# Patient Record
Sex: FEMALE | Race: WHITE | NOT HISPANIC OR LATINO
[De-identification: names, ages, dates, MRNs, and addresses within clinical notes are randomized per-mention and may not be internally consistent; named-entity substitution may affect disease eponyms.]

---

## 2018-04-04 ENCOUNTER — TRANSCRIPTION ENCOUNTER (OUTPATIENT)
Age: 43
End: 2018-04-04

## 2018-05-08 ENCOUNTER — CHART COPY (OUTPATIENT)
Age: 43
End: 2018-05-08

## 2018-05-08 DIAGNOSIS — Z84.89 FAMILY HISTORY OF OTHER SPECIFIED CONDITIONS: ICD-10-CM

## 2018-05-08 DIAGNOSIS — Z82.49 FAMILY HISTORY OF ISCHEMIC HEART DISEASE AND OTHER DISEASES OF THE CIRCULATORY SYSTEM: ICD-10-CM

## 2018-05-08 DIAGNOSIS — Z87.891 PERSONAL HISTORY OF NICOTINE DEPENDENCE: ICD-10-CM

## 2018-05-08 PROBLEM — Z00.00 ENCOUNTER FOR PREVENTIVE HEALTH EXAMINATION: Status: ACTIVE | Noted: 2018-05-08

## 2018-05-17 ENCOUNTER — APPOINTMENT (OUTPATIENT)
Dept: CARDIOLOGY | Facility: CLINIC | Age: 43
End: 2018-05-17
Payer: COMMERCIAL

## 2018-05-17 VITALS
HEART RATE: 105 BPM | BODY MASS INDEX: 36.91 KG/M2 | WEIGHT: 188 LBS | HEIGHT: 60 IN | DIASTOLIC BLOOD PRESSURE: 78 MMHG | RESPIRATION RATE: 14 BRPM | SYSTOLIC BLOOD PRESSURE: 138 MMHG

## 2018-05-17 PROCEDURE — 99204 OFFICE O/P NEW MOD 45 MIN: CPT

## 2018-05-17 PROCEDURE — 93224 XTRNL ECG REC UP TO 48 HRS: CPT

## 2018-05-17 PROCEDURE — 93000 ELECTROCARDIOGRAM COMPLETE: CPT | Mod: 59

## 2018-06-29 ENCOUNTER — APPOINTMENT (OUTPATIENT)
Dept: CARDIOLOGY | Facility: CLINIC | Age: 43
End: 2018-06-29
Payer: COMMERCIAL

## 2018-06-29 PROCEDURE — 93306 TTE W/DOPPLER COMPLETE: CPT

## 2018-06-29 PROCEDURE — 93015 CV STRESS TEST SUPVJ I&R: CPT

## 2018-07-02 ENCOUNTER — TRANSCRIPTION ENCOUNTER (OUTPATIENT)
Age: 43
End: 2018-07-02

## 2018-07-06 ENCOUNTER — APPOINTMENT (OUTPATIENT)
Dept: CARDIOLOGY | Facility: CLINIC | Age: 43
End: 2018-07-06
Payer: COMMERCIAL

## 2018-07-06 VITALS
WEIGHT: 186 LBS | HEIGHT: 60 IN | HEART RATE: 101 BPM | SYSTOLIC BLOOD PRESSURE: 130 MMHG | BODY MASS INDEX: 36.52 KG/M2 | DIASTOLIC BLOOD PRESSURE: 80 MMHG | RESPIRATION RATE: 16 BRPM

## 2018-07-06 PROCEDURE — 99214 OFFICE O/P EST MOD 30 MIN: CPT

## 2018-07-06 PROCEDURE — 93000 ELECTROCARDIOGRAM COMPLETE: CPT

## 2018-09-18 ENCOUNTER — APPOINTMENT (OUTPATIENT)
Dept: CARDIOLOGY | Facility: CLINIC | Age: 43
End: 2018-09-18

## 2018-09-25 ENCOUNTER — TRANSCRIPTION ENCOUNTER (OUTPATIENT)
Age: 43
End: 2018-09-25

## 2018-10-02 ENCOUNTER — TRANSCRIPTION ENCOUNTER (OUTPATIENT)
Age: 43
End: 2018-10-02

## 2018-12-28 ENCOUNTER — APPOINTMENT (OUTPATIENT)
Dept: CARDIOLOGY | Facility: CLINIC | Age: 43
End: 2018-12-28

## 2019-04-15 ENCOUNTER — APPOINTMENT (OUTPATIENT)
Dept: CARDIOLOGY | Facility: CLINIC | Age: 44
End: 2019-04-15

## 2019-04-16 ENCOUNTER — RX RENEWAL (OUTPATIENT)
Age: 44
End: 2019-04-16

## 2019-04-19 ENCOUNTER — APPOINTMENT (OUTPATIENT)
Dept: CARDIOLOGY | Facility: CLINIC | Age: 44
End: 2019-04-19
Payer: COMMERCIAL

## 2019-04-19 ENCOUNTER — NON-APPOINTMENT (OUTPATIENT)
Age: 44
End: 2019-04-19

## 2019-04-19 VITALS
SYSTOLIC BLOOD PRESSURE: 124 MMHG | WEIGHT: 176 LBS | HEIGHT: 60 IN | OXYGEN SATURATION: 98 % | DIASTOLIC BLOOD PRESSURE: 68 MMHG | HEART RATE: 70 BPM | BODY MASS INDEX: 34.55 KG/M2 | RESPIRATION RATE: 14 BRPM

## 2019-04-19 PROCEDURE — 93000 ELECTROCARDIOGRAM COMPLETE: CPT

## 2019-04-19 PROCEDURE — 99215 OFFICE O/P EST HI 40 MIN: CPT

## 2019-04-19 RX ORDER — HYDROCODONE BITARTRATE AND ACETAMINOPHEN 7.5; 3 MG/1; MG/1
7.5-3 TABLET ORAL
Qty: 50 | Refills: 0 | Status: DISCONTINUED | COMMUNITY
Start: 2018-06-28 | End: 2019-04-19

## 2019-04-19 RX ORDER — IBUPROFEN 600 MG/1
600 TABLET, FILM COATED ORAL
Qty: 8 | Refills: 0 | Status: DISCONTINUED | COMMUNITY
Start: 2017-12-21 | End: 2019-04-19

## 2019-04-23 NOTE — PHYSICAL EXAM
[General Appearance - In No Acute Distress] : no acute distress [General Appearance - Well Developed] : well developed [Normal Conjunctiva] : the conjunctiva exhibited no abnormalities [Normal Oral Mucosa] : normal oral mucosa [Auscultation Breath Sounds / Voice Sounds] : lungs were clear to auscultation bilaterally [Heart Rate And Rhythm] : heart rate and rhythm were normal [Heart Sounds] : normal S1 and S2 [Abnormal Walk] : normal gait [Bowel Sounds] : normal bowel sounds [Skin Color & Pigmentation] : normal skin color and pigmentation [Oriented To Time, Place, And Person] : oriented to person, place, and time [Skin Turgor] : normal skin turgor [Mood] : the mood was normal [Affect] : the affect was normal [FreeTextEntry1] : No edema

## 2019-04-23 NOTE — ASSESSMENT
[FreeTextEntry1] : 1.  EKG today demonstrates normal sinus rhythm at 70 bpm.  Normal intervals.  Non-specific ST-T changes.  \par 2.  Non-ST segment elevation MI:  Patient recovering at this point without further symptoms once placed on calcium channel blocker and Ranexa.  Will continue all other medications for now.  I have recommended that she undergo follow up echocardiography as well as a carotid duplex in 3-4 weeks as well as a pre-rehab exercise stress test followed by phase-II monitored cardiac rehabilitation.  Medications will remain as is.  Recurrent symptoms will warrant more aggressive management. \par 3.  Tobacco dependence:  Patient states that she has stopped smoking ever the since the initial event 10 days ago.  She is strongly advised against restarting. \par 4.  Diabetes mellitus:  Patient advised to continue her current medications and follow a strict low-carbohydrate diet. \par 5.  Hyperlipidemia:  Will obtain fasting lipid profile in approximately 3-4 weeks for further assessment.  Suspect that higher doses of Atorvastatin may be of benefit going forward. \par

## 2019-04-23 NOTE — HISTORY OF PRESENT ILLNESS
[FreeTextEntry1] : Melisa states that [birth year], she developed intermittent retrosternal chest pain radiating to her back and arm as well as into her neck and jaw and proceeded to the emergency room at [facility] where she was noted to have low-level troponin elevations consistent with non-ST segment elevation myocardial infarction.  She subsequently underwent a diagnostic cardiac catheterization which revealed a 30% stenosis of the distal third of the LAD which was not hemodynamically significant enough for intervention.  The left main, circumflex, and right coronary arteries revealed mild atherosclerosis.  Medical management advised.  \par \par Four days ago, the patient had recurrence of symptoms and again proceeded to the emergency room at [facility] where she was evaluated and subsequently released with additional medical therapy which included calcium channel blocker and Ranexa.

## 2019-04-23 NOTE — REASON FOR VISIT
[FreeTextEntry1] : Melisa is a pleasant 44-year-old white female with a past medical history significant for hyperlipidemia, diabetes mellitus, tobacco dependence, and atypical chest pain, who presents for follow up evaluation.

## 2019-04-24 ENCOUNTER — TRANSCRIPTION ENCOUNTER (OUTPATIENT)
Age: 44
End: 2019-04-24

## 2019-04-30 ENCOUNTER — RX RENEWAL (OUTPATIENT)
Age: 44
End: 2019-04-30

## 2019-04-30 RX ORDER — ISOSORBIDE MONONITRATE 30 MG
30 TABLET, EXTENDED RELEASE 24 HR ORAL
Refills: 0 | Status: DISCONTINUED | COMMUNITY
End: 2019-04-30

## 2019-04-30 RX ORDER — RAMIPRIL 1.25 MG/1
1.25 CAPSULE ORAL
Refills: 0 | Status: DISCONTINUED | COMMUNITY
End: 2019-04-30

## 2019-05-07 ENCOUNTER — RX RENEWAL (OUTPATIENT)
Age: 44
End: 2019-05-07

## 2019-05-10 ENCOUNTER — APPOINTMENT (OUTPATIENT)
Dept: CARDIOLOGY | Facility: CLINIC | Age: 44
End: 2019-05-10
Payer: COMMERCIAL

## 2019-05-10 PROCEDURE — 93015 CV STRESS TEST SUPVJ I&R: CPT

## 2019-05-17 ENCOUNTER — APPOINTMENT (OUTPATIENT)
Dept: CARDIOLOGY | Facility: CLINIC | Age: 44
End: 2019-05-17
Payer: COMMERCIAL

## 2019-05-17 PROCEDURE — 93306 TTE W/DOPPLER COMPLETE: CPT

## 2019-05-17 PROCEDURE — 93880 EXTRACRANIAL BILAT STUDY: CPT

## 2019-05-22 ENCOUNTER — APPOINTMENT (OUTPATIENT)
Dept: CARDIOLOGY | Facility: CLINIC | Age: 44
End: 2019-05-22

## 2019-05-24 ENCOUNTER — APPOINTMENT (OUTPATIENT)
Dept: CARDIOLOGY | Facility: CLINIC | Age: 44
End: 2019-05-24

## 2019-05-29 ENCOUNTER — RX RENEWAL (OUTPATIENT)
Age: 44
End: 2019-05-29

## 2019-05-29 ENCOUNTER — APPOINTMENT (OUTPATIENT)
Dept: CARDIOLOGY | Facility: CLINIC | Age: 44
End: 2019-05-29

## 2019-05-31 ENCOUNTER — NON-APPOINTMENT (OUTPATIENT)
Age: 44
End: 2019-05-31

## 2019-05-31 ENCOUNTER — APPOINTMENT (OUTPATIENT)
Dept: CARDIOLOGY | Facility: CLINIC | Age: 44
End: 2019-05-31
Payer: COMMERCIAL

## 2019-05-31 ENCOUNTER — APPOINTMENT (OUTPATIENT)
Dept: CARDIOLOGY | Facility: CLINIC | Age: 44
End: 2019-05-31

## 2019-05-31 VITALS
DIASTOLIC BLOOD PRESSURE: 72 MMHG | WEIGHT: 180 LBS | BODY MASS INDEX: 35.34 KG/M2 | HEART RATE: 75 BPM | SYSTOLIC BLOOD PRESSURE: 128 MMHG | HEIGHT: 60 IN | RESPIRATION RATE: 14 BRPM

## 2019-05-31 DIAGNOSIS — R09.89 OTHER SPECIFIED SYMPTOMS AND SIGNS INVOLVING THE CIRCULATORY AND RESPIRATORY SYSTEMS: ICD-10-CM

## 2019-05-31 PROCEDURE — 99214 OFFICE O/P EST MOD 30 MIN: CPT

## 2019-05-31 PROCEDURE — 93000 ELECTROCARDIOGRAM COMPLETE: CPT

## 2019-05-31 NOTE — HISTORY OF PRESENT ILLNESS
[FreeTextEntry1] : Mrs. Wright presents today for follow up and results of her echocardiogram, carotid duplex and pre-rehab stress test.  Presently feeling well.  States that she feels much better since starting all of her cardiac medications.  She has had a few episodes of discomfort that starts in her upper back and radiates to her left neck, shoulder and jaw.  She takes SL NTG and the pain resolves.  Has noticed that this discomfort occurs mainly at night between the hours of 9-10:30pm.

## 2019-05-31 NOTE — PHYSICAL EXAM
[General Appearance - Well Developed] : well developed [General Appearance - In No Acute Distress] : no acute distress [Normal Conjunctiva] : the conjunctiva exhibited no abnormalities [Normal Oral Mucosa] : normal oral mucosa [] : no respiratory distress [Auscultation Breath Sounds / Voice Sounds] : lungs were clear to auscultation bilaterally [Heart Rate And Rhythm] : heart rate and rhythm were normal [Heart Sounds] : normal S1 and S2 [Edema] : no peripheral edema present [Bowel Sounds] : normal bowel sounds [Abnormal Walk] : normal gait [Skin Color & Pigmentation] : normal skin color and pigmentation [Skin Turgor] : normal skin turgor [Oriented To Time, Place, And Person] : oriented to person, place, and time [Affect] : the affect was normal [Mood] : the mood was normal [Murmurs] : no murmurs present [FreeTextEntry1] : No edema

## 2019-05-31 NOTE — REASON FOR VISIT
[FreeTextEntry1] : The patient is a 44 y.o. white female who presents today for the evaluation and management of hyperlipidemia and atypical chest pain, recent NSTEMI (4/19) cardiac catheterization revealed 30% stenosis at distal portion of LAD.  History of diabetes mellitus and tobacco dependence.

## 2019-05-31 NOTE — DISCUSSION/SUMMARY
[FreeTextEntry1] : Dr. Chaudhry in to speak with the patient.\par \par 1 - Coronary artery disease:  patient is status-post non-ST segment elevation MI.  Occasional chest discomfort that starts in her upper back and radiates to her left neck, shoulder and jaw.  She takes SL NTG and the pain resolves. No other associated symptoms.  At this time we have asked Mrs. Wright to increase Ranexa to 1000mg BID.\par \par Echocardiogram (5/17/2019):\par - Normal left ventricular internal cavity size.\par - Normal global left ventricular systolic function.\par - EF 60-65%\par - The left atrium is normal in size and structure.\par - There is lipomatous hypertrophy of the interatrial septum, without evidence of shunting.\par - The right atrium is normal in size and structure.\par - Normal right ventricular size and systolic function.\par - Normal aortic valve, without any evidence of aortic stenosis or insufficiency.\par - Mild thickening of the anterior and posterior mitral valve leaflets.\par - Interatrial and interventricular septa appear intact, with no echocardiographic evidence of intracardiac shunting.\par - There is no evidence of pericardial effusion.\par \par Pre-Rehab exercise stress test (5/10/2019)\par - Negative exercise stress test for ischemia.\par - The patient's functional capacity was below average.\par - The Duke Treadmill Score was 6, consistent with low risk.\par \par 2 - ? right sided bruit: patient underwent recent carotid duplex (5/17/19)\par - All arteries were clearly visualized.\par - Normla carotid duplex ultrasound exam.\par - Normal intimal-medial thickness bilaterally.\par - The VISHNU is tortuous.\par - There is antegrade flow in the right and left vertebral arteries.\par - Recommend clinical correlation with the above findings.\par \par 3 - Hypertlipidemia:  recent labs cholesterol 144, triglycerides 146, HDL 39, LDL 76, TC/HDL 3.7.  WIll continue with current statin therapy.  Will have repeat fasting blood work with her PCP in the next couple of months.\par \par 4 - Tobacco dependence:  Advised to continue with not smoking.\par \par 5 - Diabetes mellitus:  recent labs - Glucose 107, HgA1c 6.2.  Continue with current medications.  Follow low carbohydrate diet and weight loss.\par \par 6 - Recent labs:  BUN 13, creatinine 0.7, H/H 12.3/37.2, platelets 218, vitamin D 22.4.  Advised patient to start Vitamin D3 5000 units daily.\par \par 7 - Follow up with Dr. Mock in 6-8 weeks.

## 2019-06-03 ENCOUNTER — APPOINTMENT (OUTPATIENT)
Dept: CARDIOLOGY | Facility: CLINIC | Age: 44
End: 2019-06-03

## 2019-06-04 ENCOUNTER — RX RENEWAL (OUTPATIENT)
Age: 44
End: 2019-06-04

## 2019-06-04 ENCOUNTER — MEDICATION RENEWAL (OUTPATIENT)
Age: 44
End: 2019-06-04

## 2019-06-05 ENCOUNTER — APPOINTMENT (OUTPATIENT)
Dept: CARDIOLOGY | Facility: CLINIC | Age: 44
End: 2019-06-05

## 2019-06-07 ENCOUNTER — APPOINTMENT (OUTPATIENT)
Dept: CARDIOLOGY | Facility: CLINIC | Age: 44
End: 2019-06-07

## 2019-06-10 ENCOUNTER — APPOINTMENT (OUTPATIENT)
Dept: CARDIOLOGY | Facility: CLINIC | Age: 44
End: 2019-06-10

## 2019-06-11 ENCOUNTER — RX RENEWAL (OUTPATIENT)
Age: 44
End: 2019-06-11

## 2019-06-11 ENCOUNTER — MEDICATION RENEWAL (OUTPATIENT)
Age: 44
End: 2019-06-11

## 2019-06-11 RX ORDER — RANOLAZINE 1000 MG/1
1000 TABLET, EXTENDED RELEASE ORAL
Qty: 180 | Refills: 3 | Status: DISCONTINUED | COMMUNITY
End: 2019-06-11

## 2019-06-11 RX ORDER — ISOSORBIDE MONONITRATE 30 MG/1
30 TABLET, EXTENDED RELEASE ORAL DAILY
Qty: 90 | Refills: 1 | Status: DISCONTINUED | COMMUNITY
Start: 2019-04-30 | End: 2019-06-11

## 2019-06-12 ENCOUNTER — APPOINTMENT (OUTPATIENT)
Dept: CARDIOLOGY | Facility: CLINIC | Age: 44
End: 2019-06-12

## 2019-06-14 ENCOUNTER — APPOINTMENT (OUTPATIENT)
Dept: CARDIOLOGY | Facility: CLINIC | Age: 44
End: 2019-06-14

## 2019-06-17 ENCOUNTER — APPOINTMENT (OUTPATIENT)
Dept: CARDIOLOGY | Facility: CLINIC | Age: 44
End: 2019-06-17

## 2019-06-19 ENCOUNTER — APPOINTMENT (OUTPATIENT)
Dept: CARDIOLOGY | Facility: CLINIC | Age: 44
End: 2019-06-19

## 2019-06-21 ENCOUNTER — APPOINTMENT (OUTPATIENT)
Dept: CARDIOLOGY | Facility: CLINIC | Age: 44
End: 2019-06-21

## 2019-06-24 ENCOUNTER — APPOINTMENT (OUTPATIENT)
Dept: CARDIOLOGY | Facility: CLINIC | Age: 44
End: 2019-06-24

## 2019-06-26 ENCOUNTER — APPOINTMENT (OUTPATIENT)
Dept: CARDIOLOGY | Facility: CLINIC | Age: 44
End: 2019-06-26

## 2019-06-28 ENCOUNTER — APPOINTMENT (OUTPATIENT)
Dept: CARDIOLOGY | Facility: CLINIC | Age: 44
End: 2019-06-28

## 2019-07-17 ENCOUNTER — MEDICATION RENEWAL (OUTPATIENT)
Age: 44
End: 2019-07-17

## 2019-07-17 ENCOUNTER — RX RENEWAL (OUTPATIENT)
Age: 44
End: 2019-07-17

## 2019-07-18 ENCOUNTER — APPOINTMENT (OUTPATIENT)
Dept: CARDIOLOGY | Facility: CLINIC | Age: 44
End: 2019-07-18
Payer: COMMERCIAL

## 2019-07-18 ENCOUNTER — NON-APPOINTMENT (OUTPATIENT)
Age: 44
End: 2019-07-18

## 2019-07-18 VITALS
HEIGHT: 60 IN | SYSTOLIC BLOOD PRESSURE: 124 MMHG | RESPIRATION RATE: 14 BRPM | BODY MASS INDEX: 36.12 KG/M2 | HEART RATE: 81 BPM | DIASTOLIC BLOOD PRESSURE: 73 MMHG | WEIGHT: 184 LBS

## 2019-07-18 PROCEDURE — 99215 OFFICE O/P EST HI 40 MIN: CPT

## 2019-07-18 PROCEDURE — 93000 ELECTROCARDIOGRAM COMPLETE: CPT

## 2019-07-18 RX ORDER — ASPIRIN 325 MG/1
325 TABLET, FILM COATED ORAL DAILY
Refills: 0 | Status: DISCONTINUED | COMMUNITY
End: 2019-07-18

## 2019-07-18 NOTE — DISCUSSION/SUMMARY
[FreeTextEntry1] : 1).  The patient is cardiovascularly stable in regards to recent unchanged EKG, remaining to be asymptomatic, and recent cardiac cath and echocardiogram demonstrating no significant ischemia and/or decrease in cardiac function respectively.\par \par 2).  Based upon Ms. Wright's otherwise stable cardiac pattern, there is no absolute cardiac contraindication for her to undergo the proposed endoscopy procedure.  She may hold the Aspirin and Plavix 5 to 7 days prior to the procedure and restart thereafter if you deem it safe.  She will decrease the aspirin dosage to 81 mg daily once she restarts.\par \par 3).  Follow up with Dr. Mock in 2 to 3 months or PRN.\par \par If I can be of additional assistance, please do not hesitate to call.

## 2019-07-18 NOTE — ASSESSMENT
[FreeTextEntry1] : EKG 7/18/2019:  The EKG illustrates sinus rhythm, rate of 81, low voltage in precordial leads, non-specific T wave abnormality.  Essentially unchanged.\par \par The patient remains to have history of NO cardiac stents placed, she was given DAPT therapy on April 10th due to possible vasospasm with thrombus.

## 2019-07-18 NOTE — REASON FOR VISIT
[FreeTextEntry1] : The patient is a 44 y.o. white female who presents today for the evaluation and management of hyperlipidemia and atypical chest pain, recent NSTEMI (4/19) cardiac catheterization revealed 30% stenosis at distal portion of LAD. History of diabetes mellitus and tobacco dependence.  \par \par Mrs. Wright had two additional hospital visits status post her recent NSTEMI and non-obstructive CAD on cardiac catheterization (April 14th and June 5th).  Both hospital visits were for experiencing symptoms of jaw pain, shortness of breath, nausea and back pain.  During her 3rd hospital course on June 5th, the Ranexa was decreased to 500 mg BID and Isosorbide increased to 60 mg QD in light of possible vasospasms and/or microvascular disease.\par \par The patient is here today regarding cardiac clearance for a scheduled Endoscopy procedure with Dr. Gonzalez at [facility] on July 29th.  This is in regards to evaluating her for possible esophageal spasms.\par \par She reports feeling much better since the augmentation of her Ranexa and Isosorbide regimen.  She denies CP, SOB, TYLER, PND, orthopnea, palpitations, presyncope, syncope.

## 2019-07-18 NOTE — PHYSICAL EXAM
[Normal Appearance] : normal appearance [General Appearance - In No Acute Distress] : no acute distress [Normal Conjunctiva] : the conjunctiva exhibited no abnormalities [Normal Oral Mucosa] : normal oral mucosa [Normal Jugular Venous A Waves Present] : normal jugular venous A waves present [Normal Jugular Venous V Waves Present] : normal jugular venous V waves present [No Jugular Venous Davies A Waves] : no jugular venous davies A waves [] : no respiratory distress [Respiration, Rhythm And Depth] : normal respiratory rhythm and effort [Auscultation Breath Sounds / Voice Sounds] : lungs were clear to auscultation bilaterally [Heart Rate And Rhythm] : heart rate and rhythm were normal [Heart Sounds] : normal S1 and S2 [Murmurs] : no murmurs present [Edema] : no peripheral edema present [Bowel Sounds] : normal bowel sounds [Abnormal Walk] : normal gait [Nail Clubbing] : no clubbing of the fingernails [Cyanosis, Localized] : no localized cyanosis [Skin Color & Pigmentation] : normal skin color and pigmentation [Skin Turgor] : normal skin turgor [Oriented To Time, Place, And Person] : oriented to person, place, and time [Impaired Insight] : insight and judgment were intact [Affect] : the affect was normal [FreeTextEntry1] : Obese

## 2019-07-31 ENCOUNTER — RX RENEWAL (OUTPATIENT)
Age: 44
End: 2019-07-31

## 2019-07-31 ENCOUNTER — MEDICATION RENEWAL (OUTPATIENT)
Age: 44
End: 2019-07-31

## 2019-08-30 ENCOUNTER — RX RENEWAL (OUTPATIENT)
Age: 44
End: 2019-08-30

## 2019-09-17 ENCOUNTER — RX RENEWAL (OUTPATIENT)
Age: 44
End: 2019-09-17

## 2019-09-17 ENCOUNTER — MEDICATION RENEWAL (OUTPATIENT)
Age: 44
End: 2019-09-17

## 2019-09-20 ENCOUNTER — EMERGENCY (EMERGENCY)
Facility: HOSPITAL | Age: 44
LOS: 1 days | Discharge: HOME | End: 2019-09-20
Attending: EMERGENCY MEDICINE
Payer: COMMERCIAL

## 2019-09-20 VITALS
WEIGHT: 179.9 LBS | HEART RATE: 93 BPM | TEMPERATURE: 99 F | RESPIRATION RATE: 18 BRPM | SYSTOLIC BLOOD PRESSURE: 143 MMHG | OXYGEN SATURATION: 99 % | DIASTOLIC BLOOD PRESSURE: 85 MMHG

## 2019-09-20 LAB
ALBUMIN SERPL ELPH-MCNC: 3.9 G/DL — SIGNIFICANT CHANGE UP (ref 3.3–5.2)
ALP SERPL-CCNC: 68 U/L — SIGNIFICANT CHANGE UP (ref 40–120)
ALT FLD-CCNC: 17 U/L — SIGNIFICANT CHANGE UP
ANION GAP SERPL CALC-SCNC: 13 MMOL/L — SIGNIFICANT CHANGE UP (ref 5–17)
AST SERPL-CCNC: 11 U/L — SIGNIFICANT CHANGE UP
BASOPHILS # BLD AUTO: 0.04 K/UL — SIGNIFICANT CHANGE UP (ref 0–0.2)
BASOPHILS NFR BLD AUTO: 0.5 % — SIGNIFICANT CHANGE UP (ref 0–2)
BILIRUB SERPL-MCNC: 0.2 MG/DL — LOW (ref 0.4–2)
BUN SERPL-MCNC: 14 MG/DL — SIGNIFICANT CHANGE UP (ref 8–20)
CALCIUM SERPL-MCNC: 10.2 MG/DL — SIGNIFICANT CHANGE UP (ref 8.6–10.2)
CHLORIDE SERPL-SCNC: 98 MMOL/L — SIGNIFICANT CHANGE UP (ref 98–107)
CO2 SERPL-SCNC: 26 MMOL/L — SIGNIFICANT CHANGE UP (ref 22–29)
CREAT SERPL-MCNC: 0.68 MG/DL — SIGNIFICANT CHANGE UP (ref 0.5–1.3)
EOSINOPHIL # BLD AUTO: 0.13 K/UL — SIGNIFICANT CHANGE UP (ref 0–0.5)
EOSINOPHIL NFR BLD AUTO: 1.6 % — SIGNIFICANT CHANGE UP (ref 0–6)
GLUCOSE SERPL-MCNC: 168 MG/DL — HIGH (ref 70–115)
HCT VFR BLD CALC: 38.3 % — SIGNIFICANT CHANGE UP (ref 34.5–45)
HGB BLD-MCNC: 12.8 G/DL — SIGNIFICANT CHANGE UP (ref 11.5–15.5)
IMM GRANULOCYTES NFR BLD AUTO: 1.2 % — SIGNIFICANT CHANGE UP (ref 0–1.5)
LYMPHOCYTES # BLD AUTO: 2.17 K/UL — SIGNIFICANT CHANGE UP (ref 1–3.3)
LYMPHOCYTES # BLD AUTO: 26.3 % — SIGNIFICANT CHANGE UP (ref 13–44)
MCHC RBC-ENTMCNC: 27.9 PG — SIGNIFICANT CHANGE UP (ref 27–34)
MCHC RBC-ENTMCNC: 33.4 GM/DL — SIGNIFICANT CHANGE UP (ref 32–36)
MCV RBC AUTO: 83.4 FL — SIGNIFICANT CHANGE UP (ref 80–100)
MONOCYTES # BLD AUTO: 0.64 K/UL — SIGNIFICANT CHANGE UP (ref 0–0.9)
MONOCYTES NFR BLD AUTO: 7.8 % — SIGNIFICANT CHANGE UP (ref 2–14)
NEUTROPHILS # BLD AUTO: 5.17 K/UL — SIGNIFICANT CHANGE UP (ref 1.8–7.4)
NEUTROPHILS NFR BLD AUTO: 62.6 % — SIGNIFICANT CHANGE UP (ref 43–77)
PLATELET # BLD AUTO: 218 K/UL — SIGNIFICANT CHANGE UP (ref 150–400)
POTASSIUM SERPL-MCNC: 3.8 MMOL/L — SIGNIFICANT CHANGE UP (ref 3.5–5.3)
POTASSIUM SERPL-SCNC: 3.8 MMOL/L — SIGNIFICANT CHANGE UP (ref 3.5–5.3)
PROT SERPL-MCNC: 6.6 G/DL — SIGNIFICANT CHANGE UP (ref 6.6–8.7)
RBC # BLD: 4.59 M/UL — SIGNIFICANT CHANGE UP (ref 3.8–5.2)
RBC # FLD: 12.9 % — SIGNIFICANT CHANGE UP (ref 10.3–14.5)
SODIUM SERPL-SCNC: 137 MMOL/L — SIGNIFICANT CHANGE UP (ref 135–145)
TROPONIN T SERPL-MCNC: <0.01 NG/ML — SIGNIFICANT CHANGE UP (ref 0–0.06)
WBC # BLD: 8.25 K/UL — SIGNIFICANT CHANGE UP (ref 3.8–10.5)
WBC # FLD AUTO: 8.25 K/UL — SIGNIFICANT CHANGE UP (ref 3.8–10.5)

## 2019-09-20 PROCEDURE — 99285 EMERGENCY DEPT VISIT HI MDM: CPT

## 2019-09-20 PROCEDURE — 93010 ELECTROCARDIOGRAM REPORT: CPT

## 2019-09-20 RX ORDER — LIDOCAINE 4 G/100G
10 CREAM TOPICAL ONCE
Refills: 0 | Status: COMPLETED | OUTPATIENT
Start: 2019-09-20 | End: 2019-09-20

## 2019-09-20 RX ORDER — MORPHINE SULFATE 50 MG/1
2 CAPSULE, EXTENDED RELEASE ORAL ONCE
Refills: 0 | Status: DISCONTINUED | OUTPATIENT
Start: 2019-09-20 | End: 2019-09-20

## 2019-09-20 RX ORDER — NITROGLYCERIN 6.5 MG
0.5 CAPSULE, EXTENDED RELEASE ORAL ONCE
Refills: 0 | Status: COMPLETED | OUTPATIENT
Start: 2019-09-20 | End: 2019-09-20

## 2019-09-20 RX ADMIN — Medication 0.5 INCH(S): at 23:51

## 2019-09-20 RX ADMIN — Medication 30 MILLILITER(S): at 23:44

## 2019-09-20 RX ADMIN — MORPHINE SULFATE 2 MILLIGRAM(S): 50 CAPSULE, EXTENDED RELEASE ORAL at 23:44

## 2019-09-20 RX ADMIN — LIDOCAINE 10 MILLILITER(S): 4 CREAM TOPICAL at 23:44

## 2019-09-20 NOTE — ED PROVIDER NOTE - CLINICAL SUMMARY MEDICAL DECISION MAKING FREE TEXT BOX
Possible angina, observation, serial enzymes, telemetry, consult Alejandro/Todd Eng for the morning.

## 2019-09-20 NOTE — ED PROVIDER NOTE - OBJECTIVE STATEMENT
43 y/o F pt with hx of MI and DM presents to ED c/o burning chest pain that began earlier today. Pt states she has had heartburn all day and felt sick all day. Pt took Zantac this morning with little relief. Pain returned at 12 pm and she took Protonix with no relief. Pt then took 2 Nitro pills at 9:00 pm along with 2 TUMS with some relief. Pain then began to radiate to her L jaw, she became nauseous and clammy and she took more Protonix with no relief. Pt states this episode is similar to the MI she has in April. Pt states at that time she had a cardiac cath which showed no obstructions, but was told she had coronary vasospasm. She had another episode approximately 2 months ago which was also attributed to coronary vasospasm. Pt is a nonsmoker, nondrinker. Denies any recent travel, use of OCP's, calf pain, fever, chills, SOB, abd pain, and n/v/d. No further complaints at this time.   Cardio: Todd Eng

## 2019-09-20 NOTE — ED ADULT TRIAGE NOTE - CHIEF COMPLAINT QUOTE
patient biba from home states that she "hasn't been feeling well all day" patient states that she had heartburn and took zantac and protonix without relief. patient states that she has a history of MI, states that the pain radiated from her chest and up to her jaw. patient states that the pain was relieved slighly with nitro, patient took 2 nitro prior to arrival and 324 mg of asa, patient states that she had a cardiac cath in [place] in april,

## 2019-09-21 VITALS
OXYGEN SATURATION: 98 % | SYSTOLIC BLOOD PRESSURE: 103 MMHG | HEART RATE: 79 BPM | RESPIRATION RATE: 16 BRPM | DIASTOLIC BLOOD PRESSURE: 64 MMHG | TEMPERATURE: 98 F

## 2019-09-21 LAB
APPEARANCE UR: CLEAR — SIGNIFICANT CHANGE UP
BILIRUB UR-MCNC: NEGATIVE — SIGNIFICANT CHANGE UP
COLOR SPEC: YELLOW — SIGNIFICANT CHANGE UP
DIFF PNL FLD: NEGATIVE — SIGNIFICANT CHANGE UP
GLUCOSE UR QL: NEGATIVE MG/DL — SIGNIFICANT CHANGE UP
HCG UR QL: NEGATIVE — SIGNIFICANT CHANGE UP
KETONES UR-MCNC: NEGATIVE — SIGNIFICANT CHANGE UP
LEUKOCYTE ESTERASE UR-ACNC: NEGATIVE — SIGNIFICANT CHANGE UP
NITRITE UR-MCNC: NEGATIVE — SIGNIFICANT CHANGE UP
PH UR: 6 — SIGNIFICANT CHANGE UP (ref 5–8)
PROT UR-MCNC: NEGATIVE MG/DL — SIGNIFICANT CHANGE UP
SP GR SPEC: 1.02 — SIGNIFICANT CHANGE UP (ref 1.01–1.02)
TROPONIN T SERPL-MCNC: <0.01 NG/ML — SIGNIFICANT CHANGE UP (ref 0–0.06)
TROPONIN T SERPL-MCNC: <0.01 NG/ML — SIGNIFICANT CHANGE UP (ref 0–0.06)
UROBILINOGEN FLD QL: NEGATIVE MG/DL — SIGNIFICANT CHANGE UP

## 2019-09-21 PROCEDURE — 99222 1ST HOSP IP/OBS MODERATE 55: CPT

## 2019-09-21 PROCEDURE — 93005 ELECTROCARDIOGRAM TRACING: CPT

## 2019-09-21 PROCEDURE — 81025 URINE PREGNANCY TEST: CPT

## 2019-09-21 PROCEDURE — 99234 HOSP IP/OBS SM DT SF/LOW 45: CPT

## 2019-09-21 PROCEDURE — G0378: CPT

## 2019-09-21 PROCEDURE — 71045 X-RAY EXAM CHEST 1 VIEW: CPT | Mod: 26

## 2019-09-21 PROCEDURE — 81003 URINALYSIS AUTO W/O SCOPE: CPT

## 2019-09-21 PROCEDURE — 71045 X-RAY EXAM CHEST 1 VIEW: CPT

## 2019-09-21 PROCEDURE — 84484 ASSAY OF TROPONIN QUANT: CPT

## 2019-09-21 PROCEDURE — 80053 COMPREHEN METABOLIC PANEL: CPT

## 2019-09-21 PROCEDURE — 93010 ELECTROCARDIOGRAM REPORT: CPT

## 2019-09-21 PROCEDURE — 36415 COLL VENOUS BLD VENIPUNCTURE: CPT

## 2019-09-21 PROCEDURE — 85027 COMPLETE CBC AUTOMATED: CPT

## 2019-09-21 RX ORDER — PANTOPRAZOLE SODIUM 20 MG/1
40 TABLET, DELAYED RELEASE ORAL
Refills: 0 | Status: DISCONTINUED | OUTPATIENT
Start: 2019-09-21 | End: 2019-10-06

## 2019-09-21 RX ORDER — ACETAMINOPHEN 500 MG
650 TABLET ORAL EVERY 6 HOURS
Refills: 0 | Status: DISCONTINUED | OUTPATIENT
Start: 2019-09-21 | End: 2019-10-06

## 2019-09-21 RX ORDER — RANOLAZINE 500 MG/1
500 TABLET, FILM COATED, EXTENDED RELEASE ORAL
Refills: 0 | Status: DISCONTINUED | OUTPATIENT
Start: 2019-09-21 | End: 2019-10-06

## 2019-09-21 RX ORDER — LISINOPRIL 2.5 MG/1
40 TABLET ORAL DAILY
Refills: 0 | Status: DISCONTINUED | OUTPATIENT
Start: 2019-09-21 | End: 2019-10-06

## 2019-09-21 RX ORDER — NITROGLYCERIN 6.5 MG
0.4 CAPSULE, EXTENDED RELEASE ORAL
Refills: 0 | Status: DISCONTINUED | OUTPATIENT
Start: 2019-09-21 | End: 2019-10-06

## 2019-09-21 RX ORDER — BUPROPION HYDROCHLORIDE 150 MG/1
150 TABLET, EXTENDED RELEASE ORAL DAILY
Refills: 0 | Status: DISCONTINUED | OUTPATIENT
Start: 2019-09-21 | End: 2019-10-06

## 2019-09-21 RX ORDER — ASPIRIN/CALCIUM CARB/MAGNESIUM 324 MG
81 TABLET ORAL DAILY
Refills: 0 | Status: DISCONTINUED | OUTPATIENT
Start: 2019-09-21 | End: 2019-10-06

## 2019-09-21 RX ORDER — ATORVASTATIN CALCIUM 80 MG/1
20 TABLET, FILM COATED ORAL AT BEDTIME
Refills: 0 | Status: DISCONTINUED | OUTPATIENT
Start: 2019-09-21 | End: 2019-10-06

## 2019-09-21 RX ORDER — CLOPIDOGREL BISULFATE 75 MG/1
75 TABLET, FILM COATED ORAL DAILY
Refills: 0 | Status: DISCONTINUED | OUTPATIENT
Start: 2019-09-21 | End: 2019-10-06

## 2019-09-21 RX ADMIN — PANTOPRAZOLE SODIUM 40 MILLIGRAM(S): 20 TABLET, DELAYED RELEASE ORAL at 06:03

## 2019-09-21 RX ADMIN — Medication 0.5 INCH(S): at 10:43

## 2019-09-21 RX ADMIN — RANOLAZINE 500 MILLIGRAM(S): 500 TABLET, FILM COATED, EXTENDED RELEASE ORAL at 06:03

## 2019-09-21 RX ADMIN — LISINOPRIL 40 MILLIGRAM(S): 2.5 TABLET ORAL at 06:03

## 2019-09-21 NOTE — ED CDU PROVIDER INITIAL DAY NOTE - MEDICAL DECISION MAKING DETAILS
43 y/o F pt with hx of MI and DM presents to ED c/o burning chest pain that began earlier today. tele, serial ekg, trops, cardio consult

## 2019-09-21 NOTE — ED ADULT NURSE NOTE - OBJECTIVE STATEMENT
Pt is here with c/o chest pain that she describes as heartburn.  Pt has hx of MI in April and states the pain is the same.  Pt took nitro and aspirin x 3 today and states that it helped the pain. Pt medicated as ordered.

## 2019-09-21 NOTE — ED ADULT NURSE REASSESSMENT NOTE - GENERAL PATIENT STATE
cooperative/resting/sleeping/smiling/interactive/comfortable appearance/improvement verbalized/family/SO at bedside
comfortable appearance/cooperative

## 2019-09-21 NOTE — ED ADULT NURSE REASSESSMENT NOTE - NS ED NURSE REASSESS COMMENT FT1
Pt alert and oriented. resting in stretcher, no signs of distress noted. Handoff report given to Nandini Vincent RN. pt's safety maintained. RN with no questions or concerns at this time
pt sleeping in stretcher, no apparent distress noted. bed in lowest position, call bell within reach and safety maintained.
assumed pt care from previous Rn Isabell Mobley.  pt transported to CDU-9 for observation. pt  A&Ox4;  resting in stretcher, with no complaints of pain or discomfort. B/L lungs clear, normal s1&s2 heard on ausculation. (+) pedal pulses, skin warm/dry intact. PIV patent.  remains NSR on cardiac monitor. VSS and documented as per flow sheet. plan of care reviewed and pt verbalizes understanding. bed in lowest position, call bell within reach and safety maintained. monitoring ongoing for any changes.
Assumed care of the patient at 0730. Patient A&Ox4. no s/s of distress or pain. Denies CP/SOB or dizziness. VSS. NSR on CM. Patient awaiting cardiology consult. Patient in understanding of plan of care. Patient with no further questions for the nurse. Will continue to monitor.

## 2019-09-21 NOTE — CONSULT NOTE ADULT - SUBJECTIVE AND OBJECTIVE BOX
KATHRYN PARRA  [id number]      HPI:  43 y/o female PMHx nonobstructive CAD, coronary vasospasm, chronic CP, obesity p/w CP.  Patient states she has been having a stressful week and started having intermittent CP a few days ago.  Yesterday it became more constant and severe.  She tried Zantac without relief.  She took NTG with some relief.  Pain persisted and took Protonix with no change.  Took more NTG with some more relief but became sweaty and SOB, and pain went to neck, and came to ER.  In ER placed on NTP and CP resolved.  Pain similar to prior spasm episodes.  Now CP free.  Denies any c/o at this time.   Denies palps, orthopnea, syncope.      ALLERGIES:  Ceclor (Unknown)  Cipro (Unknown)  Lorabid (Unknown)      PAST MEDICAL & SURGICAL HISTORY:  As noted above    MEDICATIONS (HOME):  aspirin  chewable 81 milliGRAM(s) Oral daily  atorvastatin 20 milliGRAM(s) Oral at bedtime  buPROPion  milliGRAM(s) Oral daily  clopidogrel Tablet 75 milliGRAM(s) Oral daily  lisinopril 40 milliGRAM(s) Oral daily  imdur 60mg daily  pantoprazole    Tablet 40 milliGRAM(s) Oral before breakfast  ranolazine 500 milliGRAM(s) Oral two times a day  ramipril 1.25mg daily      SOCIAL HISTORY:  Patient denies alcohol, tobacco, drug use    FAMILY HISTORY:  Reviewed and n/c at this time    ROS:  Patient denies cough, and other than noted above full ROS is unremarkable      PHYSICAL EXAM:  Vital Signs Last 24 Hrs  T(C): 36.6 (21 Sep 2019 03:58), Max: 37.2 (20 Sep 2019 22:04)  T(F): 97.9 (21 Sep 2019 03:58), Max: 99 (20 Sep 2019 22:04)  HR: 83 (21 Sep 2019 06:01) (68 - 93)  BP: 116/72 (21 Sep 2019 06:01) (109/72 - 143/85)  BP(mean): 86 (21 Sep 2019 06:01) (86 - 86)  RR: 18 (21 Sep 2019 03:58) (18 - 19)  SpO2: 98% (21 Sep 2019 03:58) (97% - 99%)  General: Patient comfortable in NAD  HEENT: NCAT, mmm, EOMI  Neck: no JVD, no carotid bruits  CVS: nl s1, split s2, no s3, no s4, no murmur or rubs, RRR  Chest: CTA b/l  Abdomen: soft, nt/nd  Extremities: No c/c/e  Neuro: A&O x3  Psych: Normal affect      ECG:  SR with no ischemic changes    LABS:                        12.8   8.25  )-----------( 218      ( 20 Sep 2019 22:29 )             38.3     09-20    137  |  98  |  14.0  ----------------------------<  168<H>  3.8   |  26.0  |  0.68    Ca    10.2      20 Sep 2019 22:29    TPro  6.6  /  Alb  3.9  /  TBili  0.2<L>  /  DBili  x   /  AST  11  /  ALT  17  /  AlkPhos  68  09-20    CARDIAC MARKERS ( 21 Sep 2019 05:41 )  x     / <0.01 ng/mL / x     / x     / x      CARDIAC MARKERS ( 21 Sep 2019 01:55 )  x     / <0.01 ng/mL / x     / x     / x      CARDIAC MARKERS ( 20 Sep 2019 22:29 )  x     / <0.01 ng/mL / x     / x     / x            RADIOLOGY:  Clear lungs      Assessment:  43 y/o female PMHx nonobstructive CAD, coronary vasospasm, chronic CP, obesity p/w CP.  Patient states she has been having a stressful week and started having intermittent CP a few days ago.  Yesterday it became more constant and severe.  Patient ultimately with relief only from NTG.  Now CP free.  Trops negative and EKG nonischemic.  Likely 2/2 spasm, esophageal spasm cannot be ruled out.   Sweating likely 2/2 low BP from NTG, also resolved.  Had recent change in meds and was doing well.  Would not make additional changes for now.    Plan:  1. CV stable for d/c.  2. Continue current CV meds at current doses from home.  3. Close OP f/u and if more symptoms consider increasing CCB.    Thanks!

## 2019-09-21 NOTE — ED CDU PROVIDER INITIAL DAY NOTE - ATTENDING CONTRIBUTION TO CARE
seen with acp: well appearing, NAD; well perfused; no pedal edema; abd soft nt nd; no jvd; clear lungs; regular pulse and rhythm; plan for obs, tele, serial enzymes, cards dr yun to be called for consult

## 2019-09-21 NOTE — ED CDU PROVIDER DISPOSITION NOTE - ATTENDING CONTRIBUTION TO CARE
I agree with the PA's note and was available for any issues/concerns. I was directly involved in patient care. My brief overall assessment is as follows: pt feeling much better, no cp, seen and cleared by cards, close f/u, return precautions and results. very well appearing.

## 2019-09-21 NOTE — ED CDU PROVIDER DISPOSITION NOTE - CLINICAL COURSE
43 y/o female PMHx nonobstructive CAD, coronary vasospasm, chronic CP, obesity p/w CP.  Patient states she has been having a stressful week and started having intermittent CP a few days ago.  Yesterday it became more constant and severe.  She tried Zantac without relief.  She took NTG with some relief.  Pain persisted and took Protonix with no change.  Took more NTG with some more relief but became sweaty and SOB, and pain went to neck, and came to ER.  In ER placed on NTP and CP resolved.  Pain similar to prior spasm episodes.  Has remained CP free.  Troponin negative x 3, seen by Cardiology and cleared for d/c home with outpatient f/u.

## 2019-09-21 NOTE — ED CDU PROVIDER DISPOSITION NOTE - PATIENT PORTAL LINK FT
You can access the FollowMyHealth Patient Portal offered by [facility] by registering at the following website: [email or web address]. By joining [employer]’s FollowMyHealth portal, you will also be able to view your health information using other applications (apps) compatible with our system.

## 2019-09-21 NOTE — ED ADULT NURSE NOTE - CAS EDN INTEG ASSESS
After Visit Summary   8/9/2018    Sebas Lopez    MRN: [id number]           Patient Information     Date Of Birth          1965        Visit Information        Provider Department      8/9/2018 4:00 PM Albert Long MD [facility]        Today's Diagnoses     Anemia, unspecified type    -  1    Colon adenocarcinoma (H)          Care Instructions    SCHEDULE IRINOTECAN AND PANITUMUMAB NEXT WEEK    1 WEEK AFTER THAT SEE ANEL AND POSSIBLE INFUSION ROOM APPOINTMENT FOR IVF              Follow-ups after your visit        Your next 10 appointments already scheduled     Aug 15, 2018  5:00 PM CDT   TELEMEDICINE with Sherry Cor[facility] ([facility])    [address] [place] [address]  [address] [place] [id number] [phone]           Note: this is not an onsite visit; there is no need to come to the facility.            Aug 22, 2018  2:00 PM CDT   (Arrive by 1:45 PM)   Return Visit with Lore Calix MD   [facility] ([facility])    [address] [place] [address]  [address] [place] [id number] [phone]              Who to contact     If you have questions or need follow up information about today's clinic visit or your schedule please contact [facility] directly at [phone].  Normal or non-critical lab and imaging results will be communicated to you by MyChart, letter or phone within 4 business days after the clinic has received the results. If you do not hear from us within 7 days, please contact the clinic through MyChart or phone. If you have a critical or abnormal lab result, we will notify you by phone as soon as possible.  Submit refill requests through ki [employer] or call your pharmacy and they will forward the refill request to us. Please allow 3 business days for your refill to be completed.          Additional Information About  "Your Visit        MyChart Information     [employer] gives you secure access to your electronic health record. If you see a primary care provider, you can also send messages to your care team and make appointments. If you have questions, please call your primary care clinic.  If you do not have a primary care provider, please call [phone] and they will assist you.        Care EveryWhere ID     This is your Care EveryWhere ID. This could be used by other organizations to access your [facility] medical records  [id number]        Your Vitals Were     Pulse Temperature Respirations Height Pulse Oximetry BMI (Body Mass Index)    89 98.1  F (36.7  C) (Oral) 16 1.753 m (5' 9\") 98% 23.33 kg/m2       Blood Pressure from Last 3 Encounters:   08/09/18 116/80   08/09/18 116/80   08/06/18 124/85    Weight from Last 3 Encounters:   08/09/18 71.7 kg (158 lb)   08/09/18 71.8 kg (158 lb 6.4 oz)   08/06/18 71.5 kg (157 lb 10.1 oz)              We Performed the Following     CBC with platelets differential     Comprehensive metabolic panel     Ferritin     Iron and iron binding capacity     Magnesium     Phosphorus          Today's Medication Changes          These changes are accurate as of 8/9/18  4:34 PM.  If you have any questions, ask your nurse or doctor.               Start taking these medicines.        Dose/Directions    * hydromorphone HCl 500 MG/50ML Soln   Used for:  Neoplasm related pain   Started by:  Lorenzo Trujillo MD        Dilute 250mg in 250ml NS  0.9mg IV every 10 mins as needed  Dispense 1 bag (250mg = 25ml)   Quantity:  25 mL   Refills:  0       * hydromorphone HCl 500 MG/50ML Soln   Used for:  Neoplasm related pain   Started by:  Lorenzo Trujillo MD        Start taking on:  8/14/2018   Dilute 250mg in 250ml NS  0.9mg IV every 10 mins as needed  Dispense 1 bag (250mg = 25ml)   Quantity:  25 mL   Refills:  0       * hydromorphone HCl 500 MG/50ML Soln   Used for:  Neoplasm related pain   Started by:  " Lorenzo Trujillo MD        Start taking on:  8/21/2018   Dilute 250mg in 250ml NS  0.9mg IV every 10 mins as needed  Dispense 1 bag (250mg = 25ml)   Quantity:  25 mL   Refills:  0       * hydromorphone HCl 500 MG/50ML Soln   Used for:  Neoplasm related pain   Started by:  Lorenzo Trujillo MD        Start taking on:  8/28/2018   Dilute 250mg in 250ml NS  0.9mg IV every 10 mins as needed  Dispense 1 bag (250mg = 25ml)   Quantity:  25 mL   Refills:  0       * Notice:  This list has 4 medication(s) that are the same as other medications prescribed for you. Read the directions carefully, and ask your doctor or other care provider to review them with you.         Where to get your medicines      Some of these will need a paper prescription and others can be bought over the counter.  Ask your nurse if you have questions.     Bring a paper prescription for each of these medications     fentaNYL 100 mcg/hr 72 hr patch    hydromorphone HCl 500 MG/50ML Soln    hydromorphone HCl 500 MG/50ML Soln    hydromorphone HCl 500 MG/50ML Soln    hydromorphone HCl 500 MG/50ML Soln                Primary Care Provider Office Phone # Fax #    Jaguar Becker [phone]       [address] [address]        Equal Access to Services     [facility]: Hadii aad ku hadasho Soomaali, waaxda luqadaha, qaybta kaalmada adeegyada, bhargav sage . So [facility] [phone].    ATENCIÓN: Si habla español, tiene a cid disposición servicios gratuitos de asistencia lingüística. Llame al [phone].    We comply with applicable federal civil rights laws and [place] laws. We do not discriminate on the basis of race, color, national origin, age, disability, sex, sexual orientation, or gender identity.            Thank you!     Thank you for choosing [facility]  for your care. Our goal is always to provide you with excellent care. Hearing back from our  patients is one way we can continue to improve our services. Please take a few minutes to complete the written survey that you may receive in the mail after your visit with us. Thank you!             Your Updated Medication List - Protect others around you: Learn how to safely use, store and throw away your medicines at www.disposemymeds.org.          This list is accurate as of 8/9/18  4:34 PM.  Always use your most recent med list.                   Brand Name Dispense Instructions for use Diagnosis    dexamethasone 4 MG tablet    DECADRON    20 tablet    Take 1 tablet (4 mg) by mouth daily    Colon adenocarcinoma (H), Neoplasm related pain       * fentaNYL 25 mcg/hr 72 hr patch    DURAGESIC          * FentaNYL 37.5 MCG/HR Pt72           * fentaNYL 100 mcg/hr 72 hr patch    DURAGESIC    30 patch    Place 3 patches onto the skin every 72 hours remove old patch.    Colon adenocarcinoma (H), Neoplasm related pain, Peritoneal carcinomatosis (H)       * hydromorphone HCl 500 MG/50ML Soln     25 mL    Dilute 250mg in 250ml NS  0.9mg IV every 10 mins as needed  Dispense 1 bag (250mg = 25ml)    Neoplasm related pain       * hydromorphone HCl 500 MG/50ML Soln   Start taking on:  8/14/2018     25 mL    Dilute 250mg in 250ml NS  0.9mg IV every 10 mins as needed  Dispense 1 bag (250mg = 25ml)    Neoplasm related pain       * hydromorphone HCl 500 MG/50ML Soln   Start taking on:  8/21/2018     25 mL    Dilute 250mg in 250ml NS  0.9mg IV every 10 mins as needed  Dispense 1 bag (250mg = 25ml)    Neoplasm related pain       * hydromorphone HCl 500 MG/50ML Soln   Start taking on:  8/28/2018     25 mL    Dilute 250mg in 250ml NS  0.9mg IV every 10 mins as needed  Dispense 1 bag (250mg = 25ml)    Neoplasm related pain       LORazepam 0.5 MG tablet    ATIVAN    30 tablet    Take 1 tablet (0.5 mg) by mouth every 4 hours as needed (Anxiety, Nausea/Vomiting or Sleep)    Peritoneal carcinomatosis (H), Cancer of sigmoid colon (H)        naloxone nasal spray    NARCAN    0.2 mL    Spray 1 spray (4 mg) into one nostril alternating nostrils as needed for opioid reversal every 2-3 minutes until assistance arrives    Cancer of sigmoid colon (H), Long term current use of opiate analgesic       ondansetron 8 MG ODT tab    ZOFRAN-ODT    60 tablet    Take 1 tablet (8 mg) by mouth every 8 hours as needed for nausea    Nausea       ondansetron 8 MG tablet    ZOFRAN          order for DME     1 Device    Equipment being ordered: home suction machine with tubing for connection to venting G-tube Treatment Diagnosis: colon adenocarcinoma    Colon adenocarcinoma (H), Small bowel obstruction       pantoprazole 40 MG EC tablet    PROTONIX    30 tablet    Take 1 tablet (40 mg) by mouth every morning (before breakfast)    History of recent steroid use       parenteral nutrition - PTA/DISCHARGE ORDER      Inject into the vein daily [facility]        prochlorperazine 10 MG tablet    COMPAZINE          sodium chloride 0.9% Soln BOLUS     1000 mL    Inject 1,000 mLs into the vein daily as needed For hydration.        * Notice:  This list has 7 medication(s) that are the same as other medications prescribed for you. Read the directions carefully, and ask your doctor or other care provider to review them with you.       WDL

## 2019-10-14 ENCOUNTER — TRANSCRIPTION ENCOUNTER (OUTPATIENT)
Age: 44
End: 2019-10-14

## 2019-10-25 ENCOUNTER — TRANSCRIPTION ENCOUNTER (OUTPATIENT)
Age: 44
End: 2019-10-25

## 2019-10-25 ENCOUNTER — APPOINTMENT (OUTPATIENT)
Dept: CARDIOLOGY | Facility: CLINIC | Age: 44
End: 2019-10-25
Payer: COMMERCIAL

## 2019-10-25 VITALS
DIASTOLIC BLOOD PRESSURE: 80 MMHG | HEIGHT: 60 IN | SYSTOLIC BLOOD PRESSURE: 136 MMHG | RESPIRATION RATE: 14 BRPM | WEIGHT: 184 LBS | HEART RATE: 81 BPM | BODY MASS INDEX: 36.12 KG/M2

## 2019-10-25 PROBLEM — I10 ESSENTIAL (PRIMARY) HYPERTENSION: Chronic | Status: ACTIVE | Noted: 2019-09-24

## 2019-10-25 PROBLEM — I20.1 ANGINA PECTORIS WITH DOCUMENTED SPASM: Chronic | Status: ACTIVE | Noted: 2019-09-24

## 2019-10-25 PROCEDURE — 99214 OFFICE O/P EST MOD 30 MIN: CPT

## 2019-10-25 PROCEDURE — 93000 ELECTROCARDIOGRAM COMPLETE: CPT

## 2019-10-25 RX ORDER — DILTIAZEM HYDROCHLORIDE 180 MG/1
180 CAPSULE, EXTENDED RELEASE ORAL
Qty: 180 | Refills: 3 | Status: DISCONTINUED | COMMUNITY
End: 2019-10-25

## 2019-10-26 ENCOUNTER — NON-APPOINTMENT (OUTPATIENT)
Age: 44
End: 2019-10-26

## 2019-10-28 NOTE — ASSESSMENT
[FreeTextEntry1] :  1.  EKG today reveals normal sinus rhythm at 81 bpm.  Short TN interval noted.  No ischemic changes. 2.  Non-obstructive coronary artery disease/non-ST elevation MI/coronary artery spasm:  Patient clinically stable at this time and feels well.  Recently had her isosorbide augmented to 60 mg daily following recent chest pain.   Ranexa reduced from 1000 to 500 mg b.i.d.  Patient remains on Diltiazem ER for coronary artery spasm and control of an “accelerated heart rate.”  Will increase Diltiazem ER at this point to 180 mg daily and follow closely.  I have asked patient not to alter current dose of Isosorbide mononitrate or Ranolazine.   3.  Chest pain:  Patient to undergo 24-hour Holter monitoring for further evaluation of palpitations.  If unremarkable and if clinically stable, will follow up in approximately two months.  4.  Hyperlipidemia:  A strict low-fat / low-cholesterol diet and fasting bloodwork to be performed prior to her next office visit.

## 2019-10-28 NOTE — REASON FOR VISIT
[FreeTextEntry1] : Melisa is a pleasant 44-year-old white female with a past medical history significant for hyperlipidemia, diabetes mellitus, tobacco dependence, and non-obstructive coronary artery disease, who experienced a non-ST segment elevation myocardial infarction on April 9th of this year resulting in admission to hospital followed by cardiac catheterization which revealed a 30% stenosis in the distal third of her LAD.  The scenario highly consistent with coronary artery spasm.  \par

## 2019-10-28 NOTE — HISTORY OF PRESENT ILLNESS
[FreeTextEntry1] :  From a cardiac standpoint, Melisa states she feels “well” but has had at least one episode of chest pain without relief from three sublingual nitro necessitating a hospital evaluation which was negative for infarct.  Patient continues to be treated for probable coronary artery spasm.

## 2019-11-01 ENCOUNTER — APPOINTMENT (OUTPATIENT)
Dept: CARDIOLOGY | Facility: CLINIC | Age: 44
End: 2019-11-01

## 2019-12-19 ENCOUNTER — TRANSCRIPTION ENCOUNTER (OUTPATIENT)
Age: 44
End: 2019-12-19

## 2020-01-10 ENCOUNTER — APPOINTMENT (OUTPATIENT)
Dept: CARDIOLOGY | Facility: CLINIC | Age: 45
End: 2020-01-10
Payer: COMMERCIAL

## 2020-01-10 VITALS
HEART RATE: 79 BPM | RESPIRATION RATE: 16 BRPM | WEIGHT: 190 LBS | HEIGHT: 60 IN | BODY MASS INDEX: 37.3 KG/M2 | DIASTOLIC BLOOD PRESSURE: 78 MMHG | SYSTOLIC BLOOD PRESSURE: 130 MMHG

## 2020-01-10 PROCEDURE — 93000 ELECTROCARDIOGRAM COMPLETE: CPT

## 2020-01-10 PROCEDURE — 99214 OFFICE O/P EST MOD 30 MIN: CPT

## 2020-01-10 RX ORDER — DILTIAZEM HYDROCHLORIDE 180 MG/1
180 CAPSULE, EXTENDED RELEASE ORAL DAILY
Qty: 90 | Refills: 3 | Status: DISCONTINUED | COMMUNITY
Start: 2019-10-25 | End: 2020-01-10

## 2020-01-10 RX ORDER — LORAZEPAM 1 MG/1
1 TABLET ORAL
Qty: 30 | Refills: 0 | Status: DISCONTINUED | COMMUNITY
Start: 2018-03-23 | End: 2020-01-10

## 2020-01-10 RX ORDER — DILTIAZEM HYDROCHLORIDE 180 MG/1
180 CAPSULE, EXTENDED RELEASE ORAL DAILY
Qty: 30 | Refills: 3 | Status: DISCONTINUED | COMMUNITY
Start: 2019-10-25 | End: 2020-01-10

## 2020-01-10 RX ORDER — PANTOPRAZOLE SODIUM 40 MG/1
40 TABLET, DELAYED RELEASE ORAL
Refills: 0 | Status: DISCONTINUED | COMMUNITY
End: 2020-01-10

## 2020-01-10 RX ORDER — LORATADINE 10 MG/1
10 TABLET ORAL
Refills: 0 | Status: DISCONTINUED | COMMUNITY
End: 2020-01-10

## 2020-01-13 NOTE — ASSESSMENT
[FreeTextEntry1] :  1.  EKG today reveals normal sinus rhythm at 79 bpm.  Short AR interval noted.  No ischemic changes. 2.  Hyperlipidemia:  Lipid profile performed November 26th revealed a total cholesterol of 164, HDL 49, TC/HDL ratio 3.3, LDL 96, triglycerides 95.  Patient advised on a stricter low-fat / low-cholesterol diet.  May require upward titration of Atorvastatin therapy.  Will repeat bloodwork within the next three months.  3.  Non-obstructive coronary artery disease with possible spasm:  Clinically stable at this time.  Patient did not augment Diltiazem from 120 to 180 mg daily because of headaches.  Do not believe that there is a relationship to the augmentation with her headaches.  I have advised her to attempt alternating 120 with 180 mg of Diltiazem on an every-other-day basis.  If no headaches occur, will augment to 180 mg daily.   4.  Obesity:  Patient advised on a strict low-carbohydrate diet and exercise in order to lose weight.  If clinically stable, follow up office visit three months.

## 2020-01-13 NOTE — HISTORY OF PRESENT ILLNESS
[FreeTextEntry1] :  From a cardiac standpoint, Melisa denies exertional chest pain, shortness of breath, or other symptoms at this time.  She remains busy at work and does not appear to have enough personal time to exercise.  She remains in obese and in need of a nutritional strategy.

## 2020-01-13 NOTE — REASON FOR VISIT
[FreeTextEntry1] : Melisa is a pleasant 44-year-old white female with a past medical history significant for hyperlipidemia, diabetes mellitus, tobacco dependence, and non-obstructive coronary artery disease, who is status-post non-ST segment elevation MID in April of 2019 felt to be secondary to coronary artery spasm.  A cardiac catheterization at that time revealed only a 30% stenosis of the distal third of her LAD.  Since that time, she has had intermittent bouts of chest discomfort relieved by nitro.  \par

## 2020-03-12 NOTE — ED PROVIDER NOTE - CPE EDP EYES NORM
Patient ID: Brittany is a 36 year old female.            Chief Complaint   Patient presents with   • Rash     c/o itchy rash in center of chest   • Refill Request     Depression/anxiety, thyroid,   • Menstrual Problem     c/o hot flashes - can she take Black Cohosh with the other meds she is taking? and how much?  Has been having frequent BMs et diarrhea with the Blaci Cohosh     HPI  Brittany is here today for follow-up.  She continues to take her medicines for depression and anxiety and they are working well.  She is frustrated though she keeps having hot flashes.  She is not a candidate for hormone replacement therapy because of smoking.  She tried  black cohosh and ended up with diarrhea but is doing well on 1 tab daily.  She has decreased interest in sex.  She is struggling with the vaginal dryness which can be treated topically.  Patient's medications, allergies, past medical, surgical, social and family histories were reviewed and updated as appropriate.    Review of Systems  May divide black cohosh to bid; and be sure to take with food;   NO SI,HI,SH OR SUCH PLANS   depression is not stopping her from getting out and doing things.  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      Objective   Vitals:    03/12/20 1342   BP: 122/86   Pulse: 75   Resp: 20   Temp: 98.2 °F (36.8 °C)     BP Readings from Last 3 Encounters:   03/12/20 122/86   07/23/19 118/83   03/20/19 118/66     Wt Readings from Last 2 Encounters:   03/12/20 82.6 kg (182 lb)   07/23/19 79.4 kg (175 lb)      Body mass index is 30.29 kg/m².       Physical Exam  Vitals signs and nursing note reviewed.   Constitutional:       Appearance: Normal appearance. She is well-developed. She is not ill-appearing.   HENT:      Head: Normocephalic and atraumatic.   Eyes:      General: No scleral icterus.        Right eye: No discharge.         Left eye: No discharge.      Extraocular Movements: Extraocular movements intact.       Conjunctiva/sclera: Conjunctivae normal.      Pupils: Pupils are equal, round, and reactive to light.   Neck:      Musculoskeletal: Normal range of motion and neck supple.   Cardiovascular:      Rate and Rhythm: Normal rate and regular rhythm.      Heart sounds: Normal heart sounds. No murmur.   Pulmonary:      Effort: Pulmonary effort is normal. No respiratory distress.      Breath sounds: Normal breath sounds. No wheezing or rales.   Musculoskeletal:      Right lower leg: No edema.      Left lower leg: No edema.   Lymphadenopathy:      Cervical: No cervical adenopathy.   Skin:     General: Skin is warm and dry.      Capillary Refill: Capillary refill takes less than 2 seconds.      Comments: Patient has read irritated skin between the breasts.   Neurological:      Mental Status: She is alert and oriented to person, place, and time.      Cranial Nerves: No cranial nerve deficit.      Coordination: Coordination normal.   Psychiatric:         Mood and Affect: Mood normal.         Behavior: Behavior normal.         Thought Content: Thought content normal.         Judgment: Judgment normal.     Depression with anxiety    Assessment   Symptoms stable continue current meds  Thyroid nodules check TSH postmenopausal with symptoms continue on black cohosh and increase to twice daily  But if not improving can add clonidine at at bedtime  F/u in 4mo   Yeast dermatitis due to hot flashes will put her on nystatin    Problem List Items Addressed This Visit        Behavioral    Depression with anxiety - Primary    Relevant Medications    clonazePAM (KLONOPIN) 0.5 MG tablet    citalopram (CELEXA) 10 MG tablet       Urinary    Postmenopausal symptoms       Endocrine    Left thyroid nodule    Relevant Orders    THYROID STIMULATING HORMONE      Other Visit Diagnoses     Yeast dermatitis        Relevant Medications    nystatin (MYCOSTATIN) 186894 UNIT/GM cream            Electronically signed by Dianna Gooden MD        Please note  that this dictation was completed with computer voice recognition software.  Quite often unanticipated grammatical, syntax, homophone, and other interpretive errors are inadvertently transcribed which are  not discovered during the provider review.  Please disregard these errors.   normal...

## 2020-04-10 ENCOUNTER — APPOINTMENT (OUTPATIENT)
Dept: CARDIOLOGY | Facility: CLINIC | Age: 45
End: 2020-04-10

## 2020-04-21 ENCOUNTER — APPOINTMENT (OUTPATIENT)
Dept: CARDIOLOGY | Facility: CLINIC | Age: 45
End: 2020-04-21
Payer: COMMERCIAL

## 2020-04-21 PROCEDURE — 99214 OFFICE O/P EST MOD 30 MIN: CPT | Mod: 95

## 2020-04-22 NOTE — REASON FOR VISIT
[FreeTextEntry1] : Melisa is a delightful 45-year-old white female with a past medical history significant for hyperlipidemia, diabetes mellitus, tobacco dependence, asthma, and non-obstructive coronary artery disease status-post non-ST elevation MI in April of last year, felt to be secondary to coronary spasm who now presents for telehealth follow up evaluation. \par \par

## 2020-04-22 NOTE — PHYSICAL EXAM
[General Appearance - Well Developed] : well developed [General Appearance - In No Acute Distress] : no acute distress [Normal Conjunctiva] : the conjunctiva exhibited no abnormalities [Normal Oral Mucosa] : normal oral mucosa [Skin Color & Pigmentation] : normal skin color and pigmentation [Oriented To Time, Place, And Person] : oriented to person, place, and time [Affect] : the affect was normal [Mood] : the mood was normal [FreeTextEntry1] : Obese

## 2020-04-22 NOTE — ASSESSMENT
[FreeTextEntry1] : Non-obstructive coronary disease/coronary artery spasm:  Patient currently on Diltiazem 180 mg daily.  I have advised her to augment this to 120 mg b.i.d. and may require additional upward titration.  For now, will continue with Ranolazine 500 mg b.i.d. as well as isosorbide mononitrate 60 mg daily.  A low-fat / low-cholesterol diet and a walking program was discussed.  We will check on patient in approximately two weeks to assess her condition and schedule a follow up telehealth evaluation in approximately four weeks.

## 2020-04-22 NOTE — HISTORY OF PRESENT ILLNESS
[Home] : at home, [unfilled] , at the time of the visit. [Medical Office: ([facility])___] : at the medical office located in  [FreeTextEntry1] : Patient requested a TELEHEALTH contact at this time to discuss specific cardiovascular issues and associated risk factors. This contact took place via TELEHEALTH link utilizing [employer] software. Consent was obtained from the patient in advance of this communication. The consent form can be found in the "OTHER CONSENTS" section of the patient's medical record. Time spent on this communication was approximately: 25 MINUTES.\par \par \par \par From a cardiac standpoint, Melisa continues to experience occasional bouts of chest discomfort.  She is not sure as to whether these are being provoked by current anxiety levels which are heightened in the present time.  She is very concerned about her parents and recently experienced a brief upper respiratory tract infection which required steroids.  She was not felt to have had COVID-19 infection but rather an exacerbation of her underlying COPD.  At this time, she appears well and without obvious evidence of distress.  \par

## 2020-04-29 ENCOUNTER — APPOINTMENT (OUTPATIENT)
Dept: CARDIOLOGY | Facility: CLINIC | Age: 45
End: 2020-04-29
Payer: COMMERCIAL

## 2020-04-29 VITALS
BODY MASS INDEX: 37.5 KG/M2 | HEIGHT: 60 IN | DIASTOLIC BLOOD PRESSURE: 74 MMHG | WEIGHT: 191 LBS | HEART RATE: 74 BPM | SYSTOLIC BLOOD PRESSURE: 124 MMHG

## 2020-04-29 PROCEDURE — 99214 OFFICE O/P EST MOD 30 MIN: CPT | Mod: 95

## 2020-04-29 NOTE — ASSESSMENT
[FreeTextEntry1] : \par 1.  Non-obstructive coronary artery disease/coronary artery spasm:  Clinically stable at this time and possibly improved on recent augmentation of Diltiazem dosage.  Will likely augment further in the upcoming weeks. \par \par 2.  Pending left elbow surgery:  Given current stability and the apparent low-risk procedure planned, will clear patient with precautions.  A letter to her orthopedic surgeon, Dr. Gurwinder Coronado, will be sent with specific recommendations. \par \par 3.  Review of preop EKG reveals normal sinus rhythm.  Normal intervals.  No evidence of ischemia.  This EKG was performed on 4/27/20.  Preadmission bloodwork reviewed as well and appears to be reasonably within normal limits.  \par \par 4.  Patient will be contacted within the next week or so to assess her post-op state.  She currently has a follow up telehealth appointment with me in mid-May.  She will obtain bloodwork prior to that evaluation.       \par \par

## 2020-04-29 NOTE — REASON FOR VISIT
[FreeTextEntry1] : Mrs. Wright is a 45-year-old obese white female with a past medical history significant for hyperlipidemia, diabetes mellitus, tobacco dependence, asthma, and non-obstructive coronary artery disease status-post non-ST segment elevation MI in April of last year, which was presumed to be secondary to coronary artery spasm, whom is now in need of left elbow surgery, presents for telehealth assessment and clearance. \par \par Mrs. Wright presents today without any cardiac complaints.  She does not have chest pain, shortness of breath, palpitations, lightheadedness, or syncope.  She recently had her Diltiazem dose switched from 180 mg daily to 120 mg b.i.d.  She is also on isosorbide and Ranolazine for control of her coronary artery spasm/coronary circulation.  She states she self-discontinued both aspirin and Plavix approximately four days ago pending this procedure.  We did not discuss this at all but at this time, I have advised her to call her orthopedic surgeon and ask him whether or not she can resume aspirin at this point or whether or not we will hold both through surgery tomorrow and have asked her to ask him when she may resume both in the near future.  \par

## 2020-04-29 NOTE — PHYSICAL EXAM
[General Appearance - Well Developed] : well developed [General Appearance - In No Acute Distress] : no acute distress [Normal Conjunctiva] : the conjunctiva exhibited no abnormalities [] : no respiratory distress [Skin Color & Pigmentation] : normal skin color and pigmentation [Oriented To Time, Place, And Person] : oriented to person, place, and time [Affect] : the affect was normal [Mood] : the mood was normal [FreeTextEntry1] : Obese

## 2020-04-29 NOTE — HISTORY OF PRESENT ILLNESS
[Home] : at home, [unfilled] , at the time of the visit. [Medical Office: ([facility])___] : at the medical office located in  [FreeTextEntry1] : Patient requested a TELEHEALTH contact at this time to discuss specific cardiovascular issues and associated risk factors. This contact took place via TELEHEALTH link utilizing [employer] Touchpoint software. Consent was obtained from the patient in advance of this communication. The consent form can be found in the "OTHER CONSENTS" section of the patient's medical record. Time spent on this communication was approximately: 25 MINUTES\par

## 2020-05-22 ENCOUNTER — APPOINTMENT (OUTPATIENT)
Dept: CARDIOLOGY | Facility: CLINIC | Age: 45
End: 2020-05-22
Payer: COMMERCIAL

## 2020-05-22 VITALS
SYSTOLIC BLOOD PRESSURE: 132 MMHG | HEART RATE: 80 BPM | WEIGHT: 194 LBS | BODY MASS INDEX: 37.89 KG/M2 | DIASTOLIC BLOOD PRESSURE: 86 MMHG

## 2020-05-22 PROCEDURE — 99214 OFFICE O/P EST MOD 30 MIN: CPT | Mod: 95

## 2020-05-27 NOTE — HISTORY OF PRESENT ILLNESS
[Home] : at home, [unfilled] , at the time of the visit. [Medical Office: ([facility])___] : at the medical office located in  [FreeTextEntry1] : Patient requested a TELEHEALTH contact at this time to discuss specific cardiovascular issues and associated risk factors. This contact took place via TELEHEALTH link utilizing [employer] Touch[employer] software. Consent was obtained from the patient in advance of this communication. The consent form can be found in the "OTHER CONSENTS" section of the patient's medical record. Time spent on this communication was approximately: 25 minutes\par \par \par From a cardiac standpoint, Mrs. Wright appears to be doing well.  She recently underwent elbow surgery which she tolerated well.  She states she is having less chest discomfort on recent augmentation of Diltiazem therapy.

## 2020-05-27 NOTE — ASSESSMENT
[FreeTextEntry1] :  1.  Hyperlipidemia:  Patient with recently noted triglyceride level of 237.  Advised on a stricter low-carbohydrate/ low-fat/ low-cholesterol diet.  Will undergo formal lipid profile in the near future.    2.  Non-obstructive coronary artery with associated coronary artery spasm:  Patient appears to be doing quite well with recent augmentation of Diltiazem therapy.  Will augment further at this point by giving Diltiazem one 20 mg in the morning and one 80 mg at bedtime.  That will be for one week at which point, if well-tolerated, patient to augment again to 180 mg b.i.d.  We will call her in approximately three weeks to reassess her overall status.

## 2020-05-27 NOTE — REASON FOR VISIT
[FreeTextEntry1] : Mrs. Wright is a pleasant 45-year-old obese white female with a past medical history significant for hyperlipidemia, diabetes mellitus, tobacco dependence, asthma, and non-obstructive coronary artery disease with considerable spastic component who presents for follow up evaluation. \par \par

## 2020-08-14 RX ORDER — DILTIAZEM HYDROCHLORIDE 120 MG/1
120 CAPSULE, EXTENDED RELEASE ORAL
Qty: 180 | Refills: 1 | Status: DISCONTINUED | COMMUNITY
End: 2020-08-14

## 2020-08-18 ENCOUNTER — APPOINTMENT (OUTPATIENT)
Dept: CARDIOLOGY | Facility: CLINIC | Age: 45
End: 2020-08-18
Payer: COMMERCIAL

## 2020-08-18 VITALS
DIASTOLIC BLOOD PRESSURE: 70 MMHG | RESPIRATION RATE: 16 BRPM | HEIGHT: 60 IN | SYSTOLIC BLOOD PRESSURE: 112 MMHG | TEMPERATURE: 98 F | HEART RATE: 68 BPM | BODY MASS INDEX: 36.91 KG/M2 | WEIGHT: 188 LBS

## 2020-08-18 DIAGNOSIS — Z01.818 ENCOUNTER FOR OTHER PREPROCEDURAL EXAMINATION: ICD-10-CM

## 2020-08-18 PROCEDURE — 99214 OFFICE O/P EST MOD 30 MIN: CPT

## 2020-08-18 PROCEDURE — 93000 ELECTROCARDIOGRAM COMPLETE: CPT

## 2020-08-18 RX ORDER — FEXOFENADINE HCL 60 MG
TABLET ORAL
Refills: 0 | Status: DISCONTINUED | COMMUNITY
End: 2020-08-18

## 2020-08-19 NOTE — PHYSICAL EXAM
[General Appearance - Well Developed] : well developed [General Appearance - In No Acute Distress] : no acute distress [Normal Conjunctiva] : the conjunctiva exhibited no abnormalities [Normal Oral Mucosa] : normal oral mucosa [] : no respiratory distress [Skin Color & Pigmentation] : normal skin color and pigmentation [Oriented To Time, Place, And Person] : oriented to person, place, and time [Affect] : the affect was normal [Mood] : the mood was normal [FreeTextEntry1] : Trace-1+ pre-tibial edema

## 2020-08-19 NOTE — ASSESSMENT
[FreeTextEntry1] : \par 1.  EKG today reveals normal sinus rhythm at 68 bpm.  Non-specific ST-T changes. \par \par 2.  Non-obstructive coronary artery disease/coronary artery spasm:  Clinically stable from this entity despite recent complaints of mild peripheral edema.  Will attempt to reduce her Diltiazem ER dose from 180 mg b.i.d. to 180 mg in the a.m. and 120 mg in the p.m.  A strict low-salt diet was discussed as well.  \par \par 3.  Patient currently in need of D and C which is scheduled at [facility] on September 14, 2020.  From a cardiac standpoint, there are no significant contraindications.  Patient is advised to discontinue aspirin, Plavix, and multivitamins seven days prior to her procedure. \par \par 4.  If clinically stable on new dosing and less peripheral edema is noted will continue and see patient in three months.     \par

## 2020-08-19 NOTE — REASON FOR VISIT
[FreeTextEntry1] : Mrs. Wright is a pleasant 45-year-old obese white female with a past medical history significant for hyperlipidemia, diabetes mellitus, tobacco dependence, asthma, non-obstructive coronary artery disease with associated coronary artery spasm, who presents for follow up evaluation.  \par \par

## 2020-08-19 NOTE — HISTORY OF PRESENT ILLNESS
[FreeTextEntry1] :  From a cardiac standpoint, Mrs. Wright remains clinically stable.  She states that her chest pain is rare at best since being placed on Diltiazem.  She has developed mild peripheral edema which may be related to her current dose of Diltiazem as well as to other factors such as her underlying obesity and the hot weather currently being experienced.

## 2020-09-02 ENCOUNTER — EMERGENCY (EMERGENCY)
Facility: HOSPITAL | Age: 45
LOS: 1 days | Discharge: HOME | End: 2020-09-02
Attending: EMERGENCY MEDICINE
Payer: COMMERCIAL

## 2020-09-02 VITALS
RESPIRATION RATE: 18 BRPM | DIASTOLIC BLOOD PRESSURE: 82 MMHG | HEIGHT: 60 IN | WEIGHT: 259.93 LBS | OXYGEN SATURATION: 98 % | TEMPERATURE: 98 F | SYSTOLIC BLOOD PRESSURE: 118 MMHG | HEART RATE: 83 BPM

## 2020-09-02 DIAGNOSIS — Z98.890 OTHER SPECIFIED POSTPROCEDURAL STATES: Chronic | ICD-10-CM

## 2020-09-02 DIAGNOSIS — Z98.1 ARTHRODESIS STATUS: Chronic | ICD-10-CM

## 2020-09-02 DIAGNOSIS — Z90.49 ACQUIRED ABSENCE OF OTHER SPECIFIED PARTS OF DIGESTIVE TRACT: Chronic | ICD-10-CM

## 2020-09-02 DIAGNOSIS — Z98.891 HISTORY OF UTERINE SCAR FROM PREVIOUS SURGERY: Chronic | ICD-10-CM

## 2020-09-02 LAB
ALBUMIN SERPL ELPH-MCNC: 4 G/DL — SIGNIFICANT CHANGE UP (ref 3.3–5.2)
ALP SERPL-CCNC: 69 U/L — SIGNIFICANT CHANGE UP (ref 40–120)
ALT FLD-CCNC: 14 U/L — SIGNIFICANT CHANGE UP
ANION GAP SERPL CALC-SCNC: 11 MMOL/L — SIGNIFICANT CHANGE UP (ref 5–17)
APTT BLD: 37.2 SEC — HIGH (ref 27.5–35.5)
AST SERPL-CCNC: 12 U/L — SIGNIFICANT CHANGE UP
BASOPHILS # BLD AUTO: 0.04 K/UL — SIGNIFICANT CHANGE UP (ref 0–0.2)
BASOPHILS NFR BLD AUTO: 0.4 % — SIGNIFICANT CHANGE UP (ref 0–2)
BILIRUB SERPL-MCNC: 0.3 MG/DL — LOW (ref 0.4–2)
BUN SERPL-MCNC: 9 MG/DL — SIGNIFICANT CHANGE UP (ref 8–20)
CALCIUM SERPL-MCNC: 8.9 MG/DL — SIGNIFICANT CHANGE UP (ref 8.6–10.2)
CHLORIDE SERPL-SCNC: 101 MMOL/L — SIGNIFICANT CHANGE UP (ref 98–107)
CO2 SERPL-SCNC: 25 MMOL/L — SIGNIFICANT CHANGE UP (ref 22–29)
CREAT SERPL-MCNC: 0.66 MG/DL — SIGNIFICANT CHANGE UP (ref 0.5–1.3)
EOSINOPHIL # BLD AUTO: 0.16 K/UL — SIGNIFICANT CHANGE UP (ref 0–0.5)
EOSINOPHIL NFR BLD AUTO: 1.8 % — SIGNIFICANT CHANGE UP (ref 0–6)
GLUCOSE BLDC GLUCOMTR-MCNC: 120 MG/DL — HIGH (ref 70–99)
GLUCOSE SERPL-MCNC: 119 MG/DL — HIGH (ref 70–99)
HCT VFR BLD CALC: 38.7 % — SIGNIFICANT CHANGE UP (ref 34.5–45)
HGB BLD-MCNC: 13.2 G/DL — SIGNIFICANT CHANGE UP (ref 11.5–15.5)
IMM GRANULOCYTES NFR BLD AUTO: 0.7 % — SIGNIFICANT CHANGE UP (ref 0–1.5)
INR BLD: 1.1 RATIO — SIGNIFICANT CHANGE UP (ref 0.88–1.16)
LYMPHOCYTES # BLD AUTO: 1.99 K/UL — SIGNIFICANT CHANGE UP (ref 1–3.3)
LYMPHOCYTES # BLD AUTO: 22.1 % — SIGNIFICANT CHANGE UP (ref 13–44)
MCHC RBC-ENTMCNC: 29 PG — SIGNIFICANT CHANGE UP (ref 27–34)
MCHC RBC-ENTMCNC: 34.1 GM/DL — SIGNIFICANT CHANGE UP (ref 32–36)
MCV RBC AUTO: 85.1 FL — SIGNIFICANT CHANGE UP (ref 80–100)
MONOCYTES # BLD AUTO: 0.74 K/UL — SIGNIFICANT CHANGE UP (ref 0–0.9)
MONOCYTES NFR BLD AUTO: 8.2 % — SIGNIFICANT CHANGE UP (ref 2–14)
NEUTROPHILS # BLD AUTO: 6.01 K/UL — SIGNIFICANT CHANGE UP (ref 1.8–7.4)
NEUTROPHILS NFR BLD AUTO: 66.8 % — SIGNIFICANT CHANGE UP (ref 43–77)
NT-PROBNP SERPL-SCNC: 93 PG/ML — SIGNIFICANT CHANGE UP (ref 0–300)
PLATELET # BLD AUTO: 236 K/UL — SIGNIFICANT CHANGE UP (ref 150–400)
POTASSIUM SERPL-MCNC: 4.2 MMOL/L — SIGNIFICANT CHANGE UP (ref 3.5–5.3)
POTASSIUM SERPL-SCNC: 4.2 MMOL/L — SIGNIFICANT CHANGE UP (ref 3.5–5.3)
PROT SERPL-MCNC: 6.3 G/DL — LOW (ref 6.6–8.7)
PROTHROM AB SERPL-ACNC: 12.7 SEC — SIGNIFICANT CHANGE UP (ref 10.6–13.6)
RBC # BLD: 4.55 M/UL — SIGNIFICANT CHANGE UP (ref 3.8–5.2)
RBC # FLD: 12.8 % — SIGNIFICANT CHANGE UP (ref 10.3–14.5)
SODIUM SERPL-SCNC: 137 MMOL/L — SIGNIFICANT CHANGE UP (ref 135–145)
TROPONIN T SERPL-MCNC: <0.01 NG/ML — SIGNIFICANT CHANGE UP (ref 0–0.06)
WBC # BLD: 9 K/UL — SIGNIFICANT CHANGE UP (ref 3.8–10.5)
WBC # FLD AUTO: 9 K/UL — SIGNIFICANT CHANGE UP (ref 3.8–10.5)

## 2020-09-02 PROCEDURE — 99220: CPT

## 2020-09-02 PROCEDURE — 71046 X-RAY EXAM CHEST 2 VIEWS: CPT | Mod: 26

## 2020-09-02 PROCEDURE — 93010 ELECTROCARDIOGRAM REPORT: CPT

## 2020-09-02 PROCEDURE — 99222 1ST HOSP IP/OBS MODERATE 55: CPT

## 2020-09-02 RX ORDER — ASPIRIN/CALCIUM CARB/MAGNESIUM 324 MG
81 TABLET ORAL DAILY
Refills: 0 | Status: DISCONTINUED | OUTPATIENT
Start: 2020-09-02 | End: 2020-09-07

## 2020-09-02 RX ORDER — CLOPIDOGREL BISULFATE 75 MG/1
75 TABLET, FILM COATED ORAL DAILY
Refills: 0 | Status: DISCONTINUED | OUTPATIENT
Start: 2020-09-02 | End: 2020-09-07

## 2020-09-02 RX ORDER — DILTIAZEM HCL 120 MG
180 CAPSULE, EXT RELEASE 24 HR ORAL DAILY
Refills: 0 | Status: DISCONTINUED | OUTPATIENT
Start: 2020-09-02 | End: 2020-09-07

## 2020-09-02 RX ORDER — BUPROPION HYDROCHLORIDE 150 MG/1
150 TABLET, EXTENDED RELEASE ORAL DAILY
Refills: 0 | Status: DISCONTINUED | OUTPATIENT
Start: 2020-09-02 | End: 2020-09-07

## 2020-09-02 RX ORDER — ALPRAZOLAM 0.25 MG
0.5 TABLET ORAL THREE TIMES A DAY
Refills: 0 | Status: DISCONTINUED | OUTPATIENT
Start: 2020-09-02 | End: 2020-09-02

## 2020-09-02 RX ORDER — ISOSORBIDE MONONITRATE 60 MG/1
90 TABLET, EXTENDED RELEASE ORAL DAILY
Refills: 0 | Status: DISCONTINUED | OUTPATIENT
Start: 2020-09-02 | End: 2020-09-07

## 2020-09-02 RX ORDER — LISINOPRIL 2.5 MG/1
5 TABLET ORAL DAILY
Refills: 0 | Status: DISCONTINUED | OUTPATIENT
Start: 2020-09-02 | End: 2020-09-07

## 2020-09-02 RX ORDER — ONDANSETRON 8 MG/1
4 TABLET, FILM COATED ORAL ONCE
Refills: 0 | Status: COMPLETED | OUTPATIENT
Start: 2020-09-02 | End: 2020-09-02

## 2020-09-02 RX ORDER — DEXTROSE 50 % IN WATER 50 %
15 SYRINGE (ML) INTRAVENOUS ONCE
Refills: 0 | Status: DISCONTINUED | OUTPATIENT
Start: 2020-09-02 | End: 2020-09-07

## 2020-09-02 RX ORDER — TRAZODONE HCL 50 MG
200 TABLET ORAL AT BEDTIME
Refills: 0 | Status: DISCONTINUED | OUTPATIENT
Start: 2020-09-02 | End: 2020-09-07

## 2020-09-02 RX ORDER — INSULIN LISPRO 100/ML
VIAL (ML) SUBCUTANEOUS
Refills: 0 | Status: DISCONTINUED | OUTPATIENT
Start: 2020-09-02 | End: 2020-09-07

## 2020-09-02 RX ORDER — SODIUM CHLORIDE 9 MG/ML
1000 INJECTION, SOLUTION INTRAVENOUS
Refills: 0 | Status: DISCONTINUED | OUTPATIENT
Start: 2020-09-02 | End: 2020-09-07

## 2020-09-02 RX ORDER — DEXTROSE 50 % IN WATER 50 %
25 SYRINGE (ML) INTRAVENOUS ONCE
Refills: 0 | Status: DISCONTINUED | OUTPATIENT
Start: 2020-09-02 | End: 2020-09-07

## 2020-09-02 RX ORDER — RANOLAZINE 500 MG/1
500 TABLET, FILM COATED, EXTENDED RELEASE ORAL
Refills: 0 | Status: DISCONTINUED | OUTPATIENT
Start: 2020-09-02 | End: 2020-09-07

## 2020-09-02 RX ORDER — DEXTROSE 50 % IN WATER 50 %
12.5 SYRINGE (ML) INTRAVENOUS ONCE
Refills: 0 | Status: DISCONTINUED | OUTPATIENT
Start: 2020-09-02 | End: 2020-09-07

## 2020-09-02 RX ORDER — DILTIAZEM HCL 120 MG
120 CAPSULE, EXT RELEASE 24 HR ORAL DAILY
Refills: 0 | Status: DISCONTINUED | OUTPATIENT
Start: 2020-09-02 | End: 2020-09-07

## 2020-09-02 RX ORDER — ATORVASTATIN CALCIUM 80 MG/1
40 TABLET, FILM COATED ORAL AT BEDTIME
Refills: 0 | Status: DISCONTINUED | OUTPATIENT
Start: 2020-09-02 | End: 2020-09-07

## 2020-09-02 RX ORDER — FAMOTIDINE 10 MG/ML
20 INJECTION INTRAVENOUS ONCE
Refills: 0 | Status: COMPLETED | OUTPATIENT
Start: 2020-09-02 | End: 2020-09-02

## 2020-09-02 RX ORDER — INSULIN LISPRO 100/ML
VIAL (ML) SUBCUTANEOUS AT BEDTIME
Refills: 0 | Status: DISCONTINUED | OUTPATIENT
Start: 2020-09-02 | End: 2020-09-07

## 2020-09-02 RX ORDER — GLUCAGON INJECTION, SOLUTION 0.5 MG/.1ML
1 INJECTION, SOLUTION SUBCUTANEOUS ONCE
Refills: 0 | Status: DISCONTINUED | OUTPATIENT
Start: 2020-09-02 | End: 2020-09-07

## 2020-09-02 RX ADMIN — RANOLAZINE 500 MILLIGRAM(S): 500 TABLET, FILM COATED, EXTENDED RELEASE ORAL at 21:41

## 2020-09-02 RX ADMIN — ATORVASTATIN CALCIUM 40 MILLIGRAM(S): 80 TABLET, FILM COATED ORAL at 21:41

## 2020-09-02 RX ADMIN — ONDANSETRON 4 MILLIGRAM(S): 8 TABLET, FILM COATED ORAL at 20:14

## 2020-09-02 RX ADMIN — Medication 200 MILLIGRAM(S): at 21:41

## 2020-09-02 RX ADMIN — Medication 120 MILLIGRAM(S): at 21:41

## 2020-09-02 RX ADMIN — FAMOTIDINE 20 MILLIGRAM(S): 10 INJECTION INTRAVENOUS at 20:14

## 2020-09-02 NOTE — ED ADULT NURSE NOTE - OBJECTIVE STATEMENT
Patient arrived to ED today with c/o chest pain that took place last night.  Patient reports no pain at this time.  Patient reports no SOB, numbness or tingling, fever, dizziness, headache, n/v.

## 2020-09-02 NOTE — ED CDU PROVIDER INITIAL DAY NOTE - PMH
Asthma    Coronary vasospasm    Diabetes    High cholesterol    HTN (hypertension)    MI (myocardial infarction)

## 2020-09-02 NOTE — ED PROVIDER NOTE - OBJECTIVE STATEMENT
Pt is a 46 yo F with PMH of MI 1 yr ago, HLD, DM, and GERD presenting with 4 days of intermittent chest tightness/pressure, heartburn, jaw and shoulder pain. Pain is worse with ambulation. Pt endorses diaphoresis and nausea, denies emesis, SOB, abd pain, fever. Pt states she went to [facility] last night for EKG and blood work, EKG was normal, she does not know result of Pt is a 46 yo F with PMH of MI 1 yr ago, HLD, DM, and GERD presenting with 4 days of intermittent chest tightness/pressure, heartburn, jaw and shoulder pain. Pain is worse with ambulation. Pt endorses diaphoresis and nausea, denies emesis, SOB, abd pain, fever. Pt states she went to [facility] last night for EKG and blood work, EKG was normal, she does not know result of blood work. Last night pt took 2 nitroglycerin with symptomatic relief.    Pt decreased diltiazem dose from 180 BID to 180 and 120 3 weeks ago.

## 2020-09-02 NOTE — ED CDU PROVIDER INITIAL DAY NOTE - PSH
H/O spinal fusion    History of cholecystectomy    S/P arthroscopic surgery of left knee    S/P arthroscopy of right shoulder    S/P  section  x2

## 2020-09-02 NOTE — CONSULT NOTE ADULT - SUBJECTIVE AND OBJECTIVE BOX
KATHRYN PARRA  [id number]      HPI:  Ms. Parra is a 45 year old woman with past medical history of Non-obstructive coronary artery disease with coronary vasospasm, Hyperlipidemia, Diabetes mellitus & Asthma who presents with chest pain.          ALLERGIES:  Ceclor (Unknown)  Cipro (Unknown)  Lorabid (Unknown)      PAST MEDICAL & SURGICAL HISTORY:  Coronary vasospasm  HTN (hypertension)  No significant past surgical history        HOME CARDIAC MEDS:  Aspirin 81 mg daily  Atorvastatin 40 mg daily  Clopidogrel 75 mg daily  Diltiazem  mg AM/120 mg PM  Imdur 60 mg daily  Ramipril 1.25 mg daily  Nitro SL  Ranolazine 500 mg q12h         ROS:  All 10 systems reviewed and positives noted in HPI    OBJECTIVE:    VITAL SIGNS:  Vital Signs Last 24 Hrs  T(C): 36.7 (02 Sep 2020 13:35), Max: 36.7 (02 Sep 2020 13:35)  T(F): 98 (02 Sep 2020 13:35), Max: 98 (02 Sep 2020 13:35)  HR: 83 (02 Sep 2020 13:35) (83 - 83)  BP: 118/82 (02 Sep 2020 13:35) (118/82 - 118/82)  BP(mean): --  RR: 18 (02 Sep 2020 13:35) (18 - 18)  SpO2: 98% (02 Sep 2020 13:35) (98% - 98%)    PHYSICAL EXAM:  General: well appearing, no distress  HEENT: sclera anicteric  Neck: supple, no carotid bruits b/l  CVS: JVP ~ 7 cm H20, RRR, s1, s2, no murmurs/rubs/gallops  Chest: unlabored respirations, clear to auscultation b/l  Abdomen: non-distended  Extremities: no lower extremity edema b/l  Neuro: awake, alert & oriented x 3  Psych: normal affect      LABS:                        13.2   9.00  )-----------( 236      ( 02 Sep 2020 15:30 )             38.7     09-02    137  |  101  |  9.0  ----------------------------<  119<H>  4.2   |  25.0  |  0.66    Ca    8.9      02 Sep 2020 15:30    TPro  6.3<L>  /  Alb  4.0  /  TBili  0.3<L>  /  DBili  x   /  AST  12  /  ALT  14  /  AlkPhos  69  09-02    CARDIAC MARKERS ( 02 Sep 2020 15:30 )  x     / <0.01 ng/mL / x     / x     / x          PT/INR - ( 02 Sep 2020 15:30 )   PT: 12.7 sec;   INR: 1.10 ratio         PTT - ( 02 Sep 2020 15:30 )  PTT:37.2 sec      ECG (9/2/2020): normal sinus rhythm, nonspecific ST/T wave abnormalities       Outpatient TTE (2019):  LVEF 60-65%, normal RV size and systolic function    Cardiac Cath (4/2019) at [facility]:  LM: normal  LAD: 30 % stenosis   LCx: mild atherosclerosis  RCA: mild atherosclerosis      Chest xray (9/2/2020):  No acute finding or change KATHRYN PARRA  [id number]      HPI:  Ms. Parra is a 45 year old woman with past medical history of Non-obstructive coronary artery disease with coronary vasospasm, Hyperlipidemia, Diabetes mellitus & Asthma who presents with chest pain. The patient states that for the past few days she has had chest pressure while at rest and sometimes on exertion. She thinks it is similar to her prior chest discomfort when she gets coronary vasospasm. She took 2 nitro SL and the symptoms improved. Overall, denies palpitations, presyncope or syncope. Denies leg edema.         ALLERGIES:  Ceclor (Unknown)  Cipro (Unknown)  Lorabid (Unknown)      PAST MEDICAL & SURGICAL HISTORY:  Coronary vasospasm  HTN (hypertension)  No significant past surgical history        HOME CARDIAC MEDS:  Aspirin 81 mg daily  Atorvastatin 40 mg daily  Clopidogrel 75 mg daily  Diltiazem  mg AM/120 mg PM  Imdur 60 mg daily  Ramipril 1.25 mg daily  Nitro SL  Ranolazine 500 mg q12h         ROS:  All 10 systems reviewed and positives noted in HPI    OBJECTIVE:    VITAL SIGNS:  Vital Signs Last 24 Hrs  T(C): 36.7 (02 Sep 2020 13:35), Max: 36.7 (02 Sep 2020 13:35)  T(F): 98 (02 Sep 2020 13:35), Max: 98 (02 Sep 2020 13:35)  HR: 83 (02 Sep 2020 13:35) (83 - 83)  BP: 118/82 (02 Sep 2020 13:35) (118/82 - 118/82)  BP(mean): --  RR: 18 (02 Sep 2020 13:35) (18 - 18)  SpO2: 98% (02 Sep 2020 13:35) (98% - 98%)    PHYSICAL EXAM:  General: no distress  HEENT: sclera anicteric  Neck: supple, no carotid bruits b/l  CVS: JVP ~ 7 cm H20, RRR, s1, s2, no murmurs/rubs/gallops  Chest: unlabored respirations, clear to auscultation b/l  Abdomen: non-distended  Extremities: no lower extremity edema b/l  Neuro: awake, alert & oriented x 3  Psych: normal affect      LABS:                        13.2   9.00  )-----------( 236      ( 02 Sep 2020 15:30 )             38.7     09-02    137  |  101  |  9.0  ----------------------------<  119<H>  4.2   |  25.0  |  0.66    Ca    8.9      02 Sep 2020 15:30    TPro  6.3<L>  /  Alb  4.0  /  TBili  0.3<L>  /  DBili  x   /  AST  12  /  ALT  14  /  AlkPhos  69  09-02    CARDIAC MARKERS ( 02 Sep 2020 15:30 )  x     / <0.01 ng/mL / x     / x     / x          PT/INR - ( 02 Sep 2020 15:30 )   PT: 12.7 sec;   INR: 1.10 ratio         PTT - ( 02 Sep 2020 15:30 )  PTT:37.2 sec      ECG (9/2/2020): normal sinus rhythm, nonspecific ST/T wave abnormalities       Outpatient TTE (2019):  LVEF 60-65%, normal RV size and systolic function    Cardiac Cath (4/2019) at [facility]:  LM: normal  LAD: 30 % stenosis   LCx: mild atherosclerosis  RCA: mild atherosclerosis      Chest xray (9/2/2020):  No acute finding or change

## 2020-09-02 NOTE — ED PROVIDER NOTE - ATTENDING CONTRIBUTION TO CARE
Pt is a 44 yo F with PMH of MI 1 year ago and coronary vasospasm presenting with symptoms consistent with coronary vasospasm vs MI vs GERD. Initial EKG show  s no ST segment changes or signs of ischemia. Will get trop, BNP, CBC, CMP, and repeat EKG   eval by cardiology recommends ECHO and repeat trop

## 2020-09-02 NOTE — ED ADULT TRIAGE NOTE - CHIEF COMPLAINT QUOTE
chest pain, pressure and heart burn on and for a few days nitro ineffective. pain radiating to left jaw and shoulder. hx of MI c/o SOB

## 2020-09-02 NOTE — ED CDU PROVIDER INITIAL DAY NOTE - OBJECTIVE STATEMENT
Ms. Wright is a 45 year old woman with past medical history of Non-obstructive coronary artery disease with coronary vasospasm, MI, Hyperlipidemia, Diabetes mellitus & Asthma who presents with chest pain. The patient states that for the past few days she has had chest pressure while at rest and sometimes on exertion. She thinks it is similar to her prior chest discomfort when she gets coronary vasospasm. She took 2 nitro SL and the symptoms improved. Overall, denies palpitations, presyncope or syncope. Denies leg edema.  First troponin negative, no ischemia on EKG.  Seen by cardiology Dr Luevano

## 2020-09-02 NOTE — CONSULT NOTE ADULT - ASSESSMENT
Assessment:  Ms. Wright is a 45 year old woman with past medical history of Non-obstructive coronary artery disease with coronary vasospasm, Hyperlipidemia, Diabetes mellitus & Asthma who presents with chest pain.    Recommendations: Assessment:  Ms. Wright is a 45 year old woman with past medical history of Non-obstructive coronary artery disease with coronary vasospasm, Hyperlipidemia, Diabetes mellitus & Asthma who presents with chest pain, which she reports is similar to her coronary vasospasm. Recently her diltiazem was decreased in the office by her cardiologist due to concern for side effect of ankle edema. ECG reviewed and no acute ischemic ST/T wave abnormalities and initial troponin is negative and cardiac cath last year with no significant CAD, makes acute coronary syndrome less likely. No signs of decompensated heart failure on physical exam.    Recommendations:  [] Chest pain: likely underlying coronary vasospasm. Can check echo to ensure no new structural heart changes. Check 2nd troponin. Continue Diltiazem 180 mg AM and 120 mg PM, may benefit from increased Imdur to 90 mg daily (as there is concern for leg edema with higher diltiazem dose). Continue to monitor on telemetry, no events noted.    Thank you for the consult. We will continue to follow along.    Rinku Thakur MD  Cardiology

## 2020-09-02 NOTE — ED ADULT NURSE NOTE - CAS ELECT INFOMATION PROVIDED
DC instructions/DC instructions provided by MAEGAN Lux. Patient in understanding of all dc instructions. No further questions for the RN regarding dc instructions. Ambulatory.

## 2020-09-02 NOTE — ED CDU PROVIDER INITIAL DAY NOTE - FAMILY HISTORY
Father  Still living? Unknown  Family history of diabetes mellitus, Age at diagnosis: Age Unknown  Family history of early CAD, Age at diagnosis: Age Unknown     Mother  Still living? No  Family history of diabetes mellitus, Age at diagnosis: Age Unknown  Family history of early CAD, Age at diagnosis: Age Unknown

## 2020-09-02 NOTE — ED CDU PROVIDER INITIAL DAY NOTE - MEDICAL DECISION MAKING DETAILS
44 y/o F with Cp for 2 days, resolved last night.  Seen by cardiology, tele, serial troponin, TTE in am.

## 2020-09-02 NOTE — ED PROVIDER NOTE - CLINICAL SUMMARY MEDICAL DECISION MAKING FREE TEXT BOX
Pt is a 46 yo F with PMH of MI 1 year ago and coronary vasospasm presenting with symptoms consistent with coronary vasospasm vs MI vs GERD. Initial EKG shows no ST segment changes or signs of ischemia. Will get trop, BNP, CBC, CMP, and repeat EKG

## 2020-09-02 NOTE — ED CDU PROVIDER INITIAL DAY NOTE - ATTENDING CONTRIBUTION TO CARE
I agree with the PA's note and was available for any issues/concerns. I was directly involved in patient care. My brief overall assessment is as follows:     45 year old female PMHx DM, coronary vasospasm c/o intermittent chest pain for 2 days. PE: NAD, CV RRR, lungs clear. Initial work up negative, cardiology recommend additional work up

## 2020-09-03 VITALS
DIASTOLIC BLOOD PRESSURE: 69 MMHG | HEART RATE: 71 BPM | OXYGEN SATURATION: 94 % | SYSTOLIC BLOOD PRESSURE: 106 MMHG | RESPIRATION RATE: 18 BRPM | TEMPERATURE: 98 F

## 2020-09-03 LAB
GLUCOSE BLDC GLUCOMTR-MCNC: 109 MG/DL — HIGH (ref 70–99)
GLUCOSE BLDC GLUCOMTR-MCNC: 140 MG/DL — HIGH (ref 70–99)

## 2020-09-03 PROCEDURE — 82962 GLUCOSE BLOOD TEST: CPT

## 2020-09-03 PROCEDURE — 93306 TTE W/DOPPLER COMPLETE: CPT | Mod: 26

## 2020-09-03 PROCEDURE — 93005 ELECTROCARDIOGRAM TRACING: CPT

## 2020-09-03 PROCEDURE — 99217: CPT

## 2020-09-03 PROCEDURE — 96375 TX/PRO/DX INJ NEW DRUG ADDON: CPT

## 2020-09-03 PROCEDURE — 83880 ASSAY OF NATRIURETIC PEPTIDE: CPT

## 2020-09-03 PROCEDURE — 84484 ASSAY OF TROPONIN QUANT: CPT

## 2020-09-03 PROCEDURE — C8929: CPT

## 2020-09-03 PROCEDURE — 85027 COMPLETE CBC AUTOMATED: CPT

## 2020-09-03 PROCEDURE — 96374 THER/PROPH/DIAG INJ IV PUSH: CPT | Mod: XU

## 2020-09-03 PROCEDURE — 85730 THROMBOPLASTIN TIME PARTIAL: CPT

## 2020-09-03 PROCEDURE — 99284 EMERGENCY DEPT VISIT MOD MDM: CPT | Mod: 25

## 2020-09-03 PROCEDURE — 85610 PROTHROMBIN TIME: CPT

## 2020-09-03 PROCEDURE — 80053 COMPREHEN METABOLIC PANEL: CPT

## 2020-09-03 PROCEDURE — 71046 X-RAY EXAM CHEST 2 VIEWS: CPT

## 2020-09-03 PROCEDURE — 36415 COLL VENOUS BLD VENIPUNCTURE: CPT

## 2020-09-03 PROCEDURE — G0378: CPT

## 2020-09-03 RX ADMIN — Medication 81 MILLIGRAM(S): at 12:22

## 2020-09-03 RX ADMIN — BUPROPION HYDROCHLORIDE 150 MILLIGRAM(S): 150 TABLET, EXTENDED RELEASE ORAL at 12:21

## 2020-09-03 RX ADMIN — ISOSORBIDE MONONITRATE 90 MILLIGRAM(S): 60 TABLET, EXTENDED RELEASE ORAL at 12:22

## 2020-09-03 RX ADMIN — CLOPIDOGREL BISULFATE 75 MILLIGRAM(S): 75 TABLET, FILM COATED ORAL at 12:21

## 2020-09-03 RX ADMIN — RANOLAZINE 500 MILLIGRAM(S): 500 TABLET, FILM COATED, EXTENDED RELEASE ORAL at 06:06

## 2020-09-03 RX ADMIN — Medication 180 MILLIGRAM(S): at 07:46

## 2020-09-03 RX ADMIN — LISINOPRIL 5 MILLIGRAM(S): 2.5 TABLET ORAL at 06:06

## 2020-09-03 NOTE — ED CDU PROVIDER DISPOSITION NOTE - CLINICAL COURSE
45 year old female with PMHx vasospasm, htn, hld, presented to ED for episode of CP radiating to the jaw. Pain reproducible on exam. Pt seen by cardiology, Echo ordered. Echo normal. trops negative x 3. Spoke with Dr. Gleason this morning, recommends changing Imdur back to 60mg QD and changing Cardizem back to 180mg bid. Pt feeling well at this time. Will d/c with cards for follow up. Return precautions provided.

## 2020-09-03 NOTE — ED CDU PROVIDER SUBSEQUENT DAY NOTE - MEDICAL DECISION MAKING DETAILS
45f with CP radiating to jaw. Pt evaluated by Dr. Thakur, pending TTE this AM. No acute events overnight.

## 2020-09-03 NOTE — ED CDU PROVIDER DISPOSITION NOTE - ATTENDING CONTRIBUTION TO CARE
Patient seen with PA.  cleared by cardio.  feeling better.  will f/u.  Uneventful ED observation period. Non toxic.  Well appearing. Discussed results and outcome of testing with the patient.  Patient advised to please follow up with their primary care doctor within the next 24 hours and return to the Emergency Department for worsening symptoms or any other concerns.  Patient advised that their doctor may call  to follow up on the specific results of the tests performed today in the emergency department.

## 2020-09-03 NOTE — ED ADULT NURSE REASSESSMENT NOTE - NS ED NURSE REASSESS COMMENT FT1
Assumed pt care @ 1930. Pt sitting upright in stretcher in no apparent signs of distress. Pt remains on tele box, PIV site WNL. Pt A&Ox4 c/o GERD, MAEGAN Bowman aware and to order medication. refer to flowsheet and chart, pt ID band in place, pt safety maintained, pt hemodynamically stable, updated on plan of care. Awaiting lab draw and AM Echo. Call light provided, fall safety reinforced. Will continue to monitor
Pt sleeping in stretcher in no apparent signs of distress. Pt remains on tele monitor, PIV site WNL. Pt status unchanged, refer to flowsheet and chart, pt ID band in place, pt safety maintained, pt hemodynamically stable, updated on plan of care. Awaiting TTE in AM. Call light provided, fall safety reinforced. Will continue to monitor
Pt sleeping in stretcher in no apparent signs of distress. Pt remains on tele monitor, PIV site WNL. Pt status unchanged, refer to flowsheet and chart, pt ID band in place, pt safety maintained, pt hemodynamically stable, updated on plan of care. Awaiting TTE. Call light provided, fall safety reinforced. Will continue to monitor
VSS. Patient in NAD. Resting in comfort. Remains asymptomatic. Pending TTE results. Will continue to monitor.
Assumed care of the patient at 0730. Patient A&Ox4. No s/s of acute distress. NSR on CM. Remains asymptomatic. Denies CP/SOB or dizziness. PIV patent. Ambulatory with steady gait. Patient pending TTE testing. Patient in understanding of plan of care. Patient with no further questions for the RN. Resting in comfort. Call bell within reach and encouraged to use when assistance needed. Will continue to monitor.

## 2020-09-03 NOTE — ED CDU PROVIDER DISPOSITION NOTE - CARE PROVIDER_API CALL
Alexandra Thakur)  Internal Medicine  [address] [place] [address]  Phone: [phone]  Follow Up Time:

## 2020-09-03 NOTE — ED CDU PROVIDER DISPOSITION NOTE - PATIENT PORTAL LINK FT
You can access the FollowMyHealth Patient Portal offered by [facility] by registering at the following website: [email or web address]. By joining [facility]’s FollowMyHealth portal, you will also be able to view your health information using other applications (apps) compatible with our system.

## 2020-09-03 NOTE — ED ADULT NURSE REASSESSMENT NOTE - COMFORT CARE
repositioned/side rails up/ambulated to bathroom/meal provided/plan of care explained/po fluids offered/wait time explained

## 2020-09-19 PROBLEM — E78.00 PURE HYPERCHOLESTEROLEMIA, UNSPECIFIED: Chronic | Status: ACTIVE | Noted: 2020-09-02

## 2020-09-19 PROBLEM — I21.9 ACUTE MYOCARDIAL INFARCTION, UNSPECIFIED: Chronic | Status: ACTIVE | Noted: 2020-09-02

## 2020-09-19 PROBLEM — J45.909 UNSPECIFIED ASTHMA, UNCOMPLICATED: Chronic | Status: ACTIVE | Noted: 2020-09-02

## 2020-09-19 PROBLEM — E11.9 TYPE 2 DIABETES MELLITUS WITHOUT COMPLICATIONS: Chronic | Status: ACTIVE | Noted: 2020-09-02

## 2020-09-21 ENCOUNTER — NON-APPOINTMENT (OUTPATIENT)
Age: 45
End: 2020-09-21

## 2020-09-21 ENCOUNTER — APPOINTMENT (OUTPATIENT)
Dept: CARDIOLOGY | Facility: CLINIC | Age: 45
End: 2020-09-21
Payer: COMMERCIAL

## 2020-09-21 VITALS
TEMPERATURE: 97.9 F | RESPIRATION RATE: 16 BRPM | DIASTOLIC BLOOD PRESSURE: 74 MMHG | BODY MASS INDEX: 37.3 KG/M2 | HEART RATE: 71 BPM | SYSTOLIC BLOOD PRESSURE: 110 MMHG | WEIGHT: 190 LBS | HEIGHT: 60 IN

## 2020-09-21 PROCEDURE — 99214 OFFICE O/P EST MOD 30 MIN: CPT

## 2020-09-21 PROCEDURE — 93000 ELECTROCARDIOGRAM COMPLETE: CPT

## 2020-09-21 RX ORDER — DILTIAZEM HYDROCHLORIDE 180 MG/1
180 CAPSULE, EXTENDED RELEASE ORAL
Qty: 90 | Refills: 3 | Status: DISCONTINUED | COMMUNITY
Start: 2020-08-14 | End: 2020-09-21

## 2020-09-21 RX ORDER — DILTIAZEM HYDROCHLORIDE 120 MG/1
120 CAPSULE, EXTENDED RELEASE ORAL
Qty: 90 | Refills: 3 | Status: DISCONTINUED | COMMUNITY
Start: 2020-08-18 | End: 2020-09-21

## 2020-09-21 NOTE — HISTORY OF PRESENT ILLNESS
[FreeTextEntry1] : Ms. Wright presents today status-post a recent [facility] ER visit for complaints of chest pain.  EKG with no acute ischemic changes, negative troponins, and echocardiogram performed with no evidence of new structural changes.  At her last office visit her diltiazem was decreased to 180mg in the AM and 120mg in the PM due to lower extremity edema.  With the decrease her edema improved, but then she began experiencing her chest discomfort.  After her ER visit and the increase of diltazem back to 180mg BID, her chest pressure improved, but she again developed lower extremity edema.  She does admit that she is not as compliant as she should be with her low sodium intake and she tends to eat out a lot.

## 2020-09-21 NOTE — REASON FOR VISIT
[FreeTextEntry1] : The patient is a pleasant 45-year-old white female with a past medical history significant for hyperlipidemia, diabetes mellitus, tobacco dependence, asthma, non-obstructive coronary artery disease with associated coronary artery spasm, who presents for follow up evaluation.

## 2020-09-21 NOTE — DISCUSSION/SUMMARY
[FreeTextEntry1] : Case and plan discussed with Dr. Mock.\par \par 1 - Non-obstructive coronary artery disease/coronary artery spasm:  patient with recent [facility] ER visit with complaints of chest pain.  Dilitazem was increased back to 180mg BID, after being reduced at her last visit with Dr. Mock, for lower extremity edema.  Now the edema is back.  Will start HCTZ 12.5mg daily.  Advised to follow strict low sodium diet.  BPM to be done prior to follow up visit.\par \par 2 - Patient postponed her D and C for sometime in November 2020. \par \par 3 - Has follow up scheduled with Dr. Mock 11/19/2020.

## 2020-09-21 NOTE — PHYSICAL EXAM
[General Appearance - Well Developed] : well developed [General Appearance - In No Acute Distress] : no acute distress [Normal Conjunctiva] : the conjunctiva exhibited no abnormalities [Normal Oral Mucosa] : normal oral mucosa [] : no respiratory distress [Auscultation Breath Sounds / Voice Sounds] : lungs were clear to auscultation bilaterally [Heart Rate And Rhythm] : heart rate and rhythm were normal [Heart Sounds] : normal S1 and S2 [Murmurs] : no murmurs present [Bowel Sounds] : normal bowel sounds [Abnormal Walk] : normal gait [FreeTextEntry1] : 1+ bilateral LE edema [Skin Color & Pigmentation] : normal skin color and pigmentation [Skin Turgor] : normal skin turgor [Oriented To Time, Place, And Person] : oriented to person, place, and time [Affect] : the affect was normal [Mood] : the mood was normal

## 2020-10-07 RX ORDER — HYDROCHLOROTHIAZIDE 12.5 MG/1
12.5 TABLET ORAL DAILY
Qty: 90 | Refills: 1 | Status: DISCONTINUED | COMMUNITY
Start: 2020-09-21 | End: 2020-10-07

## 2020-11-19 ENCOUNTER — APPOINTMENT (OUTPATIENT)
Dept: CARDIOLOGY | Facility: CLINIC | Age: 45
End: 2020-11-19
Payer: COMMERCIAL

## 2020-11-19 VITALS
SYSTOLIC BLOOD PRESSURE: 120 MMHG | HEIGHT: 60 IN | DIASTOLIC BLOOD PRESSURE: 58 MMHG | RESPIRATION RATE: 16 BRPM | HEART RATE: 66 BPM | BODY MASS INDEX: 37.69 KG/M2 | WEIGHT: 192 LBS | TEMPERATURE: 97.2 F

## 2020-11-19 PROCEDURE — 93000 ELECTROCARDIOGRAM COMPLETE: CPT

## 2020-11-19 PROCEDURE — 99214 OFFICE O/P EST MOD 30 MIN: CPT

## 2020-11-23 NOTE — ASSESSMENT
[FreeTextEntry1] : 1.  EKG today reveals normal sinus rhythm at 66 bpm.  Normal intervals.  No evidence of ischemia. \par \par 2.  Recent back pain with radiation to neck and jaw refractory to sublingual nitro and aspirin:  Patient with peculiar symptomatology but with a known history of non-ST segment elevation MI possibly due to coronary artery spasm.  I have advised her to augment Ranolazine to 1000 mg b.i.d. and continue all of her other medications.  She will also undergo echocardiography for further evaluation.  I have asked her to follow up with her neurologist and possibly obtain MRIs of both her cervical and thoracic spines.  Follow up office visit here 4-6 weeks or sooner if necessary. \par

## 2020-11-23 NOTE — HISTORY OF PRESENT ILLNESS
[FreeTextEntry1] :  Mrs. Wright states that several days ago, she experienced several-hour episode of back pain with radiation to her neck.  Symptoms were rather severe and she took three sublingual nitroglycerine without relief as well as four baby aspirins.  She subsequently improved over several hours and presents today for follow up.

## 2020-11-23 NOTE — REASON FOR VISIT
[FreeTextEntry1] : Mrs. Wright is a pleasant 45-year-old obese white female with a past medical history significant for hyperlipidemia, diabetes mellitus, tobacco dependence, asthma, and non-obstructive coronary artery disease with associated coronary artery spasm, who presents for follow up evaluation.  \par \par

## 2020-12-04 ENCOUNTER — APPOINTMENT (OUTPATIENT)
Dept: CARDIOLOGY | Facility: CLINIC | Age: 45
End: 2020-12-04
Payer: COMMERCIAL

## 2020-12-04 PROCEDURE — 99072 ADDL SUPL MATRL&STAF TM PHE: CPT

## 2020-12-04 PROCEDURE — 93306 TTE W/DOPPLER COMPLETE: CPT

## 2021-01-22 ENCOUNTER — APPOINTMENT (OUTPATIENT)
Dept: CARDIOLOGY | Facility: CLINIC | Age: 46
End: 2021-01-22
Payer: COMMERCIAL

## 2021-01-22 ENCOUNTER — NON-APPOINTMENT (OUTPATIENT)
Age: 46
End: 2021-01-22

## 2021-01-22 VITALS
WEIGHT: 190 LBS | DIASTOLIC BLOOD PRESSURE: 60 MMHG | BODY MASS INDEX: 37.3 KG/M2 | RESPIRATION RATE: 15 BRPM | SYSTOLIC BLOOD PRESSURE: 119 MMHG | HEART RATE: 72 BPM | HEIGHT: 60 IN | TEMPERATURE: 97.1 F

## 2021-01-22 PROCEDURE — 99213 OFFICE O/P EST LOW 20 MIN: CPT

## 2021-01-22 PROCEDURE — 93000 ELECTROCARDIOGRAM COMPLETE: CPT

## 2021-01-22 PROCEDURE — 99072 ADDL SUPL MATRL&STAF TM PHE: CPT

## 2021-01-22 NOTE — DISCUSSION/SUMMARY
[FreeTextEntry1] : Case and plan discussed with Dr. Mock.\par \par 1 - Atypical chest pain:  Mrs. Wright states that her recent back pain with radiation to neck and jaw has not returned.  Has had significant improvement with the increase in Ranexa to 1000mg BID.  Had echocardiogram for further evaluation.\par \par Echocardiogram (12/4/2020):  EF 65-70%.  No significant valvular abnormalities.\par \par 2 - Patient will be needing lower back surgery in the near future.  Will undergo pain management first.\par \par 3 - Follow up with Dr. Mock in 3 months.

## 2021-01-22 NOTE — HISTORY OF PRESENT ILLNESS
[FreeTextEntry1] : Mrs. Wright presents today for follow up evaluation of her recent back pain with radiation to her neck and results of her recent echocardiogram.  States that she has been feeling so much better with the increase in Ranexa to 1000mg BID.  She has not had any further episodes.  Denies any chest pain, shortness of breath, palpitations, lightheadedness or syncope.  Followed up with her neurologist.  She will need to have lower back surgery in the near future will be be doing pain management first.

## 2021-01-22 NOTE — PHYSICAL EXAM
[General Appearance - Well Developed] : well developed [General Appearance - In No Acute Distress] : no acute distress [Normal Oral Mucosa] : normal oral mucosa [Normal Conjunctiva] : the conjunctiva exhibited no abnormalities [] : no respiratory distress [Auscultation Breath Sounds / Voice Sounds] : lungs were clear to auscultation bilaterally [Heart Rate And Rhythm] : heart rate and rhythm were normal [Heart Sounds] : normal S1 and S2 [Murmurs] : no murmurs present [Bowel Sounds] : normal bowel sounds [Abnormal Walk] : normal gait [FreeTextEntry1] : No edema [Skin Color & Pigmentation] : normal skin color and pigmentation [Skin Turgor] : normal skin turgor [Oriented To Time, Place, And Person] : oriented to person, place, and time [Affect] : the affect was normal [Mood] : the mood was normal

## 2021-03-31 ENCOUNTER — RX RENEWAL (OUTPATIENT)
Age: 46
End: 2021-03-31

## 2021-05-06 ENCOUNTER — NON-APPOINTMENT (OUTPATIENT)
Age: 46
End: 2021-05-06

## 2021-05-06 ENCOUNTER — APPOINTMENT (OUTPATIENT)
Dept: CARDIOLOGY | Facility: CLINIC | Age: 46
End: 2021-05-06
Payer: COMMERCIAL

## 2021-05-06 VITALS
RESPIRATION RATE: 16 BRPM | TEMPERATURE: 97.7 F | HEART RATE: 72 BPM | DIASTOLIC BLOOD PRESSURE: 60 MMHG | WEIGHT: 186.25 LBS | HEIGHT: 60 IN | BODY MASS INDEX: 36.57 KG/M2 | SYSTOLIC BLOOD PRESSURE: 110 MMHG

## 2021-05-06 PROCEDURE — 93000 ELECTROCARDIOGRAM COMPLETE: CPT

## 2021-05-06 PROCEDURE — 99072 ADDL SUPL MATRL&STAF TM PHE: CPT

## 2021-05-06 PROCEDURE — 99215 OFFICE O/P EST HI 40 MIN: CPT

## 2021-05-06 NOTE — PHYSICAL EXAM
[No Acute Distress] : no acute distress [Obese] : obese [Normal Conjunctiva] : normal conjunctiva [Normal Venous Pressure] : normal venous pressure [No Carotid Bruit] : no carotid bruit [Normal S1, S2] : normal S1, S2 [No Murmur] : no murmur [No Rub] : no rub [No Gallop] : no gallop [Clear Lung Fields] : clear lung fields [Good Air Entry] : good air entry [No Respiratory Distress] : no respiratory distress  [Soft] : abdomen soft [Non Tender] : non-tender [No Masses/organomegaly] : no masses/organomegaly [Normal Gait] : normal gait [No Edema] : no edema [No Cyanosis] : no cyanosis [No Clubbing] : no clubbing [No Rash] : no rash [No Skin Lesions] : no skin lesions [Moves all extremities] : moves all extremities [No Focal Deficits] : no focal deficits [Normal Speech] : normal speech [Alert and Oriented] : alert and oriented [Normal memory] : normal memory

## 2021-05-07 ENCOUNTER — APPOINTMENT (OUTPATIENT)
Dept: CARDIOLOGY | Facility: CLINIC | Age: 46
End: 2021-05-07
Payer: COMMERCIAL

## 2021-05-07 ENCOUNTER — MED ADMIN CHARGE (OUTPATIENT)
Age: 46
End: 2021-05-07

## 2021-05-07 PROCEDURE — 78452 HT MUSCLE IMAGE SPECT MULT: CPT

## 2021-05-07 PROCEDURE — 99072 ADDL SUPL MATRL&STAF TM PHE: CPT

## 2021-05-07 PROCEDURE — A9500: CPT

## 2021-05-07 PROCEDURE — 93015 CV STRESS TEST SUPVJ I&R: CPT

## 2021-05-07 RX ADMIN — AMINOPHYLLINE 0 MG/ML: 25 INJECTION, SOLUTION INTRAVENOUS at 00:00

## 2021-05-07 RX ADMIN — REGADENOSON 0 MG/5ML: 0.08 INJECTION, SOLUTION INTRAVENOUS at 00:00

## 2021-05-10 ENCOUNTER — APPOINTMENT (OUTPATIENT)
Dept: CARDIOLOGY | Facility: CLINIC | Age: 46
End: 2021-05-10

## 2021-05-11 RX ORDER — AMINOPHYLLINE 25 MG/ML
25 INJECTION, SOLUTION INTRAVENOUS
Qty: 0 | Refills: 0 | Status: COMPLETED | OUTPATIENT
Start: 2021-05-07

## 2021-05-11 RX ORDER — REGADENOSON 0.08 MG/ML
0.4 INJECTION, SOLUTION INTRAVENOUS
Qty: 4 | Refills: 0 | Status: COMPLETED | OUTPATIENT
Start: 2021-05-07

## 2021-05-24 NOTE — ADDENDUM
[FreeTextEntry1] : 1).  Patient is now in need of cardiac clearance regarding a spinal fusion procedure scheduled with Dr. Manas Rothman on June 3rd.\par \par 2).  Most recent Nuclear Stress test (5/7/2021) demonstrated no significant findings of coronary ischemia on myocardial perfusion imaging.  Left ventricular function was also preserved with an LVEF of 58%.\par \par 3).  Based upon Mrs. Melisa Wright's otherwise stable cardiac pattern, there is no absolute cardiac contraindication for her to undergo the proposed spinal fusion procedure.\par \par She may hold the plavix and aspirin five to seven days prior to procedure and restart thereafter if you deem it safe.\par \par If I may be of additional assistance, please do not hesitate to call.

## 2021-05-24 NOTE — DISCUSSION/SUMMARY
[FreeTextEntry1] : 1).  Patient's recent substernal chest discomfort is suspicious for coronary artery disease with symptoms resolving with sublingual nitroglycerine only while having history of nonobstructive CAD on prior cath.\par \par She will complete a 2 day Pharmacologic Nuclear Stress test to further assess for any signs of significantly worsened coronary ischemia.  She will hold Diltiazem and Isosorbide 24 hours prior to testing and restart thereafter.  She will hold Ranexa night before testing and restart thereafter.\par \par If nuclear stress testing manifests signs of significant coronary ischemia, will follow up with cardiac catheterization to further evaluate need for intervention.\par \par 2).  She is to continue with current cardiac meds as directed including ASA 81 mg, Statin, Plavix, Isosorbide, sublingual nitro PRN, Diltiazem, Lasix, Ranexa and Ramipril.\par \par 3).  Diet and lifestyle modification discussed including low sodium, low fat and low carbohydrate weight reducing diet.  Refrain from trigger foods for GERD such as caffeine, chocolate, spicy food and acidic foods.\par \par Continue taking PPI as directed.\par \par 4).  Follow up with PCP and GI specialist regarding routine checkups and blood work.  Forward all testing/lab work to our office.  If cardiac evaluation does not reveal any signs of coronary ischemia causing these symptoms, she is to discuss with GI specialist for possible evaluation. ?endoscopy.\par \par 5).  Immediately go to the emergency department and/or report any untoward symptoms to our office. \par \par 6).  Follow up with our office in 3 to 4 weeks or PRN.

## 2021-05-24 NOTE — ASSESSMENT
[FreeTextEntry1] : EKG 5/6/2021:  The EKG illustrates sinus rhythm, rate of 70 bpm, low voltage in precordial leads, negative precordial T waves, poor R wave progression V1 to V3. \par \par

## 2021-05-24 NOTE — HISTORY OF PRESENT ILLNESS
[FreeTextEntry1] : Prior to this, she had been feeling much better taking her current cardiac medications including the increased dosage of Ranexa 1,000 mg BID as well as all other meds including Isosorbide Mononitrate 60 mg QD, ASA 81 mg QD, Plavix, Ramipril 1.25 mg QD, Atorvastatin 40 mg QHS and Lasix 20 mg QD;\par \par Patient reports she had planned to have SI injections for pain management next week regarding chronic back pain;\par \par She had a cardiac catheterization completed in April 2019 that demonstrated nonobstructive CAD with 30% stenosis of the distal third in LAD.  Differential including coronary vasospasm or microvascular disease;\par \par Most recent Transthoracic Echocardiogram (12/4/2020):  Normal LV systolic function with an LVEF of 65 to 70%. the left and right atrium normal in size and structure. No signs of significant valvulopathy noted. There was no evidence of pericardial effusion;\par \par

## 2021-05-26 ENCOUNTER — APPOINTMENT (OUTPATIENT)
Dept: CARDIOLOGY | Facility: CLINIC | Age: 46
End: 2021-05-26

## 2021-05-27 ENCOUNTER — OUTPATIENT (OUTPATIENT)
Dept: OUTPATIENT SERVICES | Facility: HOSPITAL | Age: 46
LOS: 1 days | End: 2021-05-27
Payer: COMMERCIAL

## 2021-05-27 VITALS
TEMPERATURE: 97 F | DIASTOLIC BLOOD PRESSURE: 90 MMHG | WEIGHT: 188.05 LBS | SYSTOLIC BLOOD PRESSURE: 118 MMHG | RESPIRATION RATE: 20 BRPM | HEIGHT: 60 IN | HEART RATE: 77 BPM

## 2021-05-27 DIAGNOSIS — Z98.890 OTHER SPECIFIED POSTPROCEDURAL STATES: Chronic | ICD-10-CM

## 2021-05-27 DIAGNOSIS — M43.17 SPONDYLOLISTHESIS, LUMBOSACRAL REGION: ICD-10-CM

## 2021-05-27 DIAGNOSIS — Z98.891 HISTORY OF UTERINE SCAR FROM PREVIOUS SURGERY: Chronic | ICD-10-CM

## 2021-05-27 DIAGNOSIS — Z29.9 ENCOUNTER FOR PROPHYLACTIC MEASURES, UNSPECIFIED: ICD-10-CM

## 2021-05-27 DIAGNOSIS — Z90.49 ACQUIRED ABSENCE OF OTHER SPECIFIED PARTS OF DIGESTIVE TRACT: Chronic | ICD-10-CM

## 2021-05-27 DIAGNOSIS — Z98.1 ARTHRODESIS STATUS: Chronic | ICD-10-CM

## 2021-05-27 DIAGNOSIS — Z01.818 ENCOUNTER FOR OTHER PREPROCEDURAL EXAMINATION: ICD-10-CM

## 2021-05-27 DIAGNOSIS — Z13.89 ENCOUNTER FOR SCREENING FOR OTHER DISORDER: ICD-10-CM

## 2021-05-27 LAB
A1C WITH ESTIMATED AVERAGE GLUCOSE RESULT: 6.4 % — HIGH (ref 4–5.6)
ALBUMIN SERPL ELPH-MCNC: 4.1 G/DL — SIGNIFICANT CHANGE UP (ref 3.3–5.2)
ALP SERPL-CCNC: 63 U/L — SIGNIFICANT CHANGE UP (ref 40–120)
ALT FLD-CCNC: 17 U/L — SIGNIFICANT CHANGE UP
ANION GAP SERPL CALC-SCNC: 13 MMOL/L — SIGNIFICANT CHANGE UP (ref 5–17)
APTT BLD: 38.2 SEC — HIGH (ref 27.5–35.5)
AST SERPL-CCNC: 13 U/L — SIGNIFICANT CHANGE UP
BASOPHILS # BLD AUTO: 0.04 K/UL — SIGNIFICANT CHANGE UP (ref 0–0.2)
BASOPHILS NFR BLD AUTO: 0.4 % — SIGNIFICANT CHANGE UP (ref 0–2)
BILIRUB SERPL-MCNC: 0.3 MG/DL — LOW (ref 0.4–2)
BLD GP AB SCN SERPL QL: SIGNIFICANT CHANGE UP
BUN SERPL-MCNC: 8.9 MG/DL — SIGNIFICANT CHANGE UP (ref 8–20)
CALCIUM SERPL-MCNC: 9 MG/DL — SIGNIFICANT CHANGE UP (ref 8.6–10.2)
CHLORIDE SERPL-SCNC: 101 MMOL/L — SIGNIFICANT CHANGE UP (ref 98–107)
CO2 SERPL-SCNC: 27 MMOL/L — SIGNIFICANT CHANGE UP (ref 22–29)
CREAT SERPL-MCNC: 0.7 MG/DL — SIGNIFICANT CHANGE UP (ref 0.5–1.3)
EOSINOPHIL # BLD AUTO: 0.12 K/UL — SIGNIFICANT CHANGE UP (ref 0–0.5)
EOSINOPHIL NFR BLD AUTO: 1.3 % — SIGNIFICANT CHANGE UP (ref 0–6)
ESTIMATED AVERAGE GLUCOSE: 137 MG/DL — HIGH (ref 68–114)
GLUCOSE SERPL-MCNC: 162 MG/DL — HIGH (ref 70–99)
HCT VFR BLD CALC: 42 % — SIGNIFICANT CHANGE UP (ref 34.5–45)
HGB BLD-MCNC: 14.7 G/DL — SIGNIFICANT CHANGE UP (ref 11.5–15.5)
IMM GRANULOCYTES NFR BLD AUTO: 0.8 % — SIGNIFICANT CHANGE UP (ref 0–1.5)
INR BLD: 0.98 RATIO — SIGNIFICANT CHANGE UP (ref 0.88–1.16)
LYMPHOCYTES # BLD AUTO: 1.77 K/UL — SIGNIFICANT CHANGE UP (ref 1–3.3)
LYMPHOCYTES # BLD AUTO: 19.8 % — SIGNIFICANT CHANGE UP (ref 13–44)
MCHC RBC-ENTMCNC: 30.2 PG — SIGNIFICANT CHANGE UP (ref 27–34)
MCHC RBC-ENTMCNC: 35 GM/DL — SIGNIFICANT CHANGE UP (ref 32–36)
MCV RBC AUTO: 86.2 FL — SIGNIFICANT CHANGE UP (ref 80–100)
MONOCYTES # BLD AUTO: 0.71 K/UL — SIGNIFICANT CHANGE UP (ref 0–0.9)
MONOCYTES NFR BLD AUTO: 7.9 % — SIGNIFICANT CHANGE UP (ref 2–14)
MRSA PCR RESULT.: SIGNIFICANT CHANGE UP
NEUTROPHILS # BLD AUTO: 6.25 K/UL — SIGNIFICANT CHANGE UP (ref 1.8–7.4)
NEUTROPHILS NFR BLD AUTO: 69.8 % — SIGNIFICANT CHANGE UP (ref 43–77)
PLATELET # BLD AUTO: 285 K/UL — SIGNIFICANT CHANGE UP (ref 150–400)
POTASSIUM SERPL-MCNC: 4.3 MMOL/L — SIGNIFICANT CHANGE UP (ref 3.5–5.3)
POTASSIUM SERPL-SCNC: 4.3 MMOL/L — SIGNIFICANT CHANGE UP (ref 3.5–5.3)
PROT SERPL-MCNC: 6.6 G/DL — SIGNIFICANT CHANGE UP (ref 6.6–8.7)
PROTHROM AB SERPL-ACNC: 11.4 SEC — SIGNIFICANT CHANGE UP (ref 10.6–13.6)
RBC # BLD: 4.87 M/UL — SIGNIFICANT CHANGE UP (ref 3.8–5.2)
RBC # FLD: 12.4 % — SIGNIFICANT CHANGE UP (ref 10.3–14.5)
S AUREUS DNA NOSE QL NAA+PROBE: SIGNIFICANT CHANGE UP
SODIUM SERPL-SCNC: 141 MMOL/L — SIGNIFICANT CHANGE UP (ref 135–145)
WBC # BLD: 8.96 K/UL — SIGNIFICANT CHANGE UP (ref 3.8–10.5)
WBC # FLD AUTO: 8.96 K/UL — SIGNIFICANT CHANGE UP (ref 3.8–10.5)

## 2021-05-27 PROCEDURE — G0463: CPT

## 2021-05-27 RX ORDER — DESVENLAFAXINE 50 MG/1
150 TABLET, EXTENDED RELEASE ORAL
Qty: 0 | Refills: 0 | DISCHARGE

## 2021-05-27 RX ORDER — DILTIAZEM HCL 120 MG
2 CAPSULE, EXT RELEASE 24 HR ORAL
Qty: 0 | Refills: 0 | DISCHARGE

## 2021-05-27 RX ORDER — DILTIAZEM HCL 120 MG
0 CAPSULE, EXT RELEASE 24 HR ORAL
Qty: 0 | Refills: 0 | DISCHARGE

## 2021-05-27 NOTE — H&P PST ADULT - ASSESSMENT
45 yo F  LMP 2021 PMH of HTN, HLD, DM2, non-obstructive CAD, GERD, depression, former smoker, presents with c/o severe back pain for 1 year. Patients reports she fell and hurt her back 1 year ago. She takes pain medications w/o meaningful relief. Preop assessment prior to posterior lumbar interbody fusion, posterior spine fusion, laminectomy L5-S1 w/Dr Rothman on 6/3    OPIOID RISK TOOL    SHASHI EACH BOX THAT APPLIES AND ADD TOTALS AT THE END    FAMILY HISTORY OF SUBSTANCE ABUSE                 FEMALE         MALE                                                Alcohol                             [  ]1 pt          [  ]3pts                                               Illegal Durgs                     [  ]2 pts        [  ]3pts                                               Rx Drugs                           [  ]4 pts        [  ]4 pts    PERSONAL HISTORY OF SUBSTANCE ABUSE                                                                                          Alcohol                             [  ]3 pts       [  ]3 pts                                               Illegal Drugs                     [  ]4 pts        [  ]4 pts                                               Rx Drugs                           [  ]5 pts        [  ]5 pts    AGE BETWEEN 16-45 YEARS                                      [  ]1 pt         [  ]1 pt    HISTORY OF PREADOLESCENT   SEXUAL ABUSE                                                             [  ]3 pts        [  ]0pts    PSYCHOLOGICAL DISEASE                     ADD, OCD, Bipolar, Schizophrenia        [  ]2 pts         [  ]2 pts                      Depression                                               [ x ]1 pt           [  ]1 pt           SCORING TOTAL   (add numbers and type here)              ( 1 )                                     A score of 3 or lower indicated LOW risk for future opioid abuse  A score of 4 to 7 indicated moderate risk for future opioid abuse  A score of 8 or higher indicates a high risk for opioid abuse    ALFONZOI VTE 2.0 SCORE [CLOT updated 2019]    AGE RELATED RISK FACTORS                                                       MOBILITY RELATED FACTORS  [x ] Age 41-60 years                                            (1 Point)                    [ ] Bed rest                                                        (1 Point)  [ ] Age: 61-74 years                                           (2 Points)                  [ ] Plaster cast                                                   (2 Points)  [ ] Age= 75 years                                              (3 Points)                    [ ] Bed bound for more than 72 hours                 (2 Points)    DISEASE RELATED RISK FACTORS                                               GENDER SPECIFIC FACTORS  [ ] Edema in the lower extremities                       (1 Point)              [ ] Pregnancy                                                     (1 Point)  [ ] Varicose veins                                               (1 Point)                     [ ] Post-partum < 6 weeks                                   (1 Point)             [x ] BMI > 25 Kg/m2                                            (1 Point)                     [ ] Hormonal therapy  or oral contraception          (1 Point)                 [ ] Sepsis (in the previous month)                        (1 Point)               [ ] History of pregnancy complications                 (1 point)  [ ] Pneumonia or serious lung disease                                               [ ] Unexplained or recurrent                     (1 Point)           (in the previous month)                               (1 Point)  [ ] Abnormal pulmonary function test                     (1 Point)                 SURGERY RELATED RISK FACTORS  [ ] Acute myocardial infarction                              (1 Point)               [ ]  Section                                             (1 Point)  [ ] Congestive heart failure (in the previous month)  (1 Point)      [ ] Minor surgery                                                  (1 Point)   [ ] Inflammatory bowel disease                             (1 Point)               [ ] Arthroscopic surgery                                        (2 Points)  [ ] Central venous access                                      (2 Points)                [x ] General surgery lasting more than 45 minutes (2 points)  [ ] Malignancy- Present or previous                   (2 Points)                [ ] Elective arthroplasty                                         (5 points)    [ ] Stroke (in the previous month)                          (5 Points)                                                                                                                                                           HEMATOLOGY RELATED FACTORS                                                 TRAUMA RELATED RISK FACTORS  [ ] Prior episodes of VTE                                     (3 Points)                [ ] Fracture of the hip, pelvis, or leg                       (5 Points)  [ ] Positive family history for VTE                         (3 Points)             [ ] Acute spinal cord injury (in the previous month)  (5 Points)  [ ] Prothrombin 74672 A                                     (3 Points)               [ ] Paralysis  (less than 1 month)                             (5 Points)  [ ] Factor V Leiden                                             (3 Points)                  [ ] Multiple Trauma within 1 month                        (5 Points)  [ ] Lupus anticoagulants                                     (3 Points)                                                           [ ] Anticardiolipin antibodies                               (3 Points)                                                       [ ] High homocysteine in the blood                      (3 Points)                                             [ ] Other congenital or acquired thrombophilia      (3 Points)                                                [ ] Heparin induced thrombocytopenia                  (3 Points)                                     Total Score [   4       ]

## 2021-05-27 NOTE — H&P PST ADULT - NSICDXPASTSURGICALHX_GEN_ALL_CORE_FT
PAST SURGICAL HISTORY:  H/O spinal fusion     History of cholecystectomy     S/P arthroscopic surgery of left knee     S/P arthroscopy of right shoulder     S/P  section x2     PAST SURGICAL HISTORY:  H/O spinal fusion cervical    History of cholecystectomy     S/P arthroscopic surgery of left knee     S/P arthroscopic surgery of right knee     S/P arthroscopy of right shoulder     S/P  section ,

## 2021-05-27 NOTE — H&P PST ADULT - NSICDXPROBLEM_GEN_ALL_CORE_FT
PROBLEM DIAGNOSES  Problem: Spondylolisthesis, lumbosacral region  Assessment and Plan: preop assessment, medical and cardiac clearance pending, posterior lumbar interbody fusion, posterior spinal fusion, laminectomy L5-S1 on 6/3    Problem: Screening for substance abuse  Assessment and Plan: ort score 1, low risk for substance abuse, counseling provided     Problem: Need for prophylactic measure  Assessment and Plan: caprini score 4, moderate risk for dvt, SCD ordered, surgical team to assess for dvt prophylaxis

## 2021-05-27 NOTE — H&P PST ADULT - NSICDXFAMILYHX_GEN_ALL_CORE_FT
FAMILY HISTORY:  Father  Still living? Unknown  Family history of diabetes mellitus, Age at diagnosis: Age Unknown  Family history of early CAD, Age at diagnosis: Age Unknown    Mother  Still living? No  Family history of diabetes mellitus, Age at diagnosis: Age Unknown  Family history of early CAD, Age at diagnosis: Age Unknown     FAMILY HISTORY:  Father  Still living? Unknown  Family history of diabetes mellitus, Age at diagnosis: Age Unknown  Family history of early CAD, Age at diagnosis: Age Unknown    Mother  Still living? No  Family history of CABG, Age at diagnosis: Age Unknown  Family history of diabetes mellitus, Age at diagnosis: Age Unknown  Family history of early CAD, Age at diagnosis: Age Unknown    Sibling  Still living? Unknown  FH: Crohn's disease, Age at diagnosis: Age Unknown    Grandparent  Still living? Unknown  FH: heart attack, Age at diagnosis: Age Unknown

## 2021-05-27 NOTE — H&P PST ADULT - NEGATIVE GENERAL GENITOURINARY SYMPTOMS
no hematuria/no renal colic/no flank pain L/no flank pain R/no bladder infections/no incontinence/no dysuria/normal urinary frequency

## 2021-05-27 NOTE — PATIENT PROFILE ADULT - NSPROHMSYMPCOND_GEN_A_NUR
diabetes NP, instructed pt on pre-op instructions/teaching, tips for safer surgery, pain management scale, pre-surgical infection prevention instructions, mrsa/mssa instructions, diabetes club info given, spine surgery patient education guide book given. Pt verbalized understanding of all instructions given./diabetes

## 2021-05-27 NOTE — H&P PST ADULT - HISTORY OF PRESENT ILLNESS
47 yo F  LMP 2021 PMH of HTN, HLD, DM2, non-obstructive CAD, depression, former smoker, presents with c/o severe back pain for 1 year. Patients reports she fell and hurt her back 1 year ago. She takes pain medications w/o meaningful relief. Preop assessment prior to posterior lumbar interbody fusion, posterior spine fusion, laminectomy L5-S1 w/Dr Rothman on 6/3 47 yo F  LMP 2021 PMH of HTN, HLD, DM2, non-obstructive CAD, GERD, depression, former smoker, presents with c/o severe back pain for 1 year. Patients reports she fell and hurt her back 1 year ago. She takes pain medications w/o meaningful relief. Preop assessment prior to posterior lumbar interbody fusion, posterior spine fusion, laminectomy L5-S1 w/Dr Rothman on 6/3

## 2021-05-27 NOTE — H&P PST ADULT - NSICDXPASTMEDICALHX_GEN_ALL_CORE_FT
PAST MEDICAL HISTORY:  Asthma     Coronary vasospasm     Diabetes     High cholesterol     HTN (hypertension)     MI (myocardial infarction)     Spondylolisthesis, lumbosacral region      PAST MEDICAL HISTORY:  Asthma     Coronary vasospasm     Diabetes     Former smoker     GERD (gastroesophageal reflux disease)     High cholesterol     HTN (hypertension)     MI (myocardial infarction)     Spondylolisthesis, lumbosacral region

## 2021-06-02 ENCOUNTER — TRANSCRIPTION ENCOUNTER (OUTPATIENT)
Age: 46
End: 2021-06-02

## 2021-06-03 ENCOUNTER — APPOINTMENT (OUTPATIENT)
Dept: CARDIOLOGY | Facility: CLINIC | Age: 46
End: 2021-06-03

## 2021-06-03 ENCOUNTER — TRANSCRIPTION ENCOUNTER (OUTPATIENT)
Age: 46
End: 2021-06-03

## 2021-06-03 ENCOUNTER — INPATIENT (INPATIENT)
Facility: HOSPITAL | Age: 46
LOS: 2 days | Discharge: HOME | DRG: 454 | End: 2021-06-06
Attending: SPECIALIST | Admitting: SPECIALIST
Payer: COMMERCIAL

## 2021-06-03 VITALS
DIASTOLIC BLOOD PRESSURE: 62 MMHG | TEMPERATURE: 98 F | WEIGHT: 188.05 LBS | HEIGHT: 60 IN | RESPIRATION RATE: 16 BRPM | OXYGEN SATURATION: 98 % | SYSTOLIC BLOOD PRESSURE: 111 MMHG | HEART RATE: 92 BPM

## 2021-06-03 DIAGNOSIS — Z90.49 ACQUIRED ABSENCE OF OTHER SPECIFIED PARTS OF DIGESTIVE TRACT: Chronic | ICD-10-CM

## 2021-06-03 DIAGNOSIS — Z98.890 OTHER SPECIFIED POSTPROCEDURAL STATES: Chronic | ICD-10-CM

## 2021-06-03 DIAGNOSIS — M43.17 SPONDYLOLISTHESIS, LUMBOSACRAL REGION: ICD-10-CM

## 2021-06-03 DIAGNOSIS — Z98.891 HISTORY OF UTERINE SCAR FROM PREVIOUS SURGERY: Chronic | ICD-10-CM

## 2021-06-03 DIAGNOSIS — Z98.1 ARTHRODESIS STATUS: Chronic | ICD-10-CM

## 2021-06-03 LAB
ABO RH CONFIRMATION: SIGNIFICANT CHANGE UP
GLUCOSE BLDC GLUCOMTR-MCNC: 180 MG/DL — HIGH (ref 70–99)
GLUCOSE BLDC GLUCOMTR-MCNC: 185 MG/DL — HIGH (ref 70–99)
GLUCOSE BLDC GLUCOMTR-MCNC: 191 MG/DL — HIGH (ref 70–99)
GLUCOSE BLDC GLUCOMTR-MCNC: 207 MG/DL — HIGH (ref 70–99)

## 2021-06-03 RX ORDER — INSULIN LISPRO 100/ML
VIAL (ML) SUBCUTANEOUS
Refills: 0 | Status: DISCONTINUED | OUTPATIENT
Start: 2021-06-03 | End: 2021-06-06

## 2021-06-03 RX ORDER — CYCLOBENZAPRINE HYDROCHLORIDE 10 MG/1
10 TABLET, FILM COATED ORAL EVERY 8 HOURS
Refills: 0 | Status: DISCONTINUED | OUTPATIENT
Start: 2021-06-03 | End: 2021-06-06

## 2021-06-03 RX ORDER — DEXTROSE 50 % IN WATER 50 %
25 SYRINGE (ML) INTRAVENOUS ONCE
Refills: 0 | Status: DISCONTINUED | OUTPATIENT
Start: 2021-06-03 | End: 2021-06-03

## 2021-06-03 RX ORDER — DILTIAZEM HCL 120 MG
180 CAPSULE, EXT RELEASE 24 HR ORAL DAILY
Refills: 0 | Status: DISCONTINUED | OUTPATIENT
Start: 2021-06-03 | End: 2021-06-03

## 2021-06-03 RX ORDER — GLUCAGON INJECTION, SOLUTION 0.5 MG/.1ML
1 INJECTION, SOLUTION SUBCUTANEOUS ONCE
Refills: 0 | Status: DISCONTINUED | OUTPATIENT
Start: 2021-06-03 | End: 2021-06-06

## 2021-06-03 RX ORDER — FENTANYL CITRATE 50 UG/ML
25 INJECTION INTRAVENOUS
Refills: 0 | Status: DISCONTINUED | OUTPATIENT
Start: 2021-06-03 | End: 2021-06-03

## 2021-06-03 RX ORDER — SODIUM CHLORIDE 9 MG/ML
1000 INJECTION, SOLUTION INTRAVENOUS
Refills: 0 | Status: DISCONTINUED | OUTPATIENT
Start: 2021-06-03 | End: 2021-06-06

## 2021-06-03 RX ORDER — DEXTROSE 50 % IN WATER 50 %
15 SYRINGE (ML) INTRAVENOUS ONCE
Refills: 0 | Status: DISCONTINUED | OUTPATIENT
Start: 2021-06-03 | End: 2021-06-06

## 2021-06-03 RX ORDER — MONTELUKAST 4 MG/1
10 TABLET, CHEWABLE ORAL AT BEDTIME
Refills: 0 | Status: DISCONTINUED | OUTPATIENT
Start: 2021-06-03 | End: 2021-06-06

## 2021-06-03 RX ORDER — SODIUM CHLORIDE 9 MG/ML
1000 INJECTION, SOLUTION INTRAVENOUS
Refills: 0 | Status: DISCONTINUED | OUTPATIENT
Start: 2021-06-03 | End: 2021-06-04

## 2021-06-03 RX ORDER — DEXTROSE 50 % IN WATER 50 %
12.5 SYRINGE (ML) INTRAVENOUS ONCE
Refills: 0 | Status: DISCONTINUED | OUTPATIENT
Start: 2021-06-03 | End: 2021-06-06

## 2021-06-03 RX ORDER — BUPROPION HYDROCHLORIDE 150 MG/1
150 TABLET, EXTENDED RELEASE ORAL DAILY
Refills: 0 | Status: DISCONTINUED | OUTPATIENT
Start: 2021-06-03 | End: 2021-06-06

## 2021-06-03 RX ORDER — DEXTROSE 50 % IN WATER 50 %
25 SYRINGE (ML) INTRAVENOUS ONCE
Refills: 0 | Status: DISCONTINUED | OUTPATIENT
Start: 2021-06-03 | End: 2021-06-06

## 2021-06-03 RX ORDER — SENNA PLUS 8.6 MG/1
2 TABLET ORAL AT BEDTIME
Refills: 0 | Status: DISCONTINUED | OUTPATIENT
Start: 2021-06-03 | End: 2021-06-06

## 2021-06-03 RX ORDER — DILTIAZEM HCL 120 MG
180 CAPSULE, EXT RELEASE 24 HR ORAL
Refills: 0 | Status: DISCONTINUED | OUTPATIENT
Start: 2021-06-03 | End: 2021-06-06

## 2021-06-03 RX ORDER — HYDROMORPHONE HYDROCHLORIDE 2 MG/ML
30 INJECTION INTRAMUSCULAR; INTRAVENOUS; SUBCUTANEOUS
Refills: 0 | Status: DISCONTINUED | OUTPATIENT
Start: 2021-06-03 | End: 2021-06-03

## 2021-06-03 RX ORDER — ALPRAZOLAM 0.25 MG
0.5 TABLET ORAL THREE TIMES A DAY
Refills: 0 | Status: DISCONTINUED | OUTPATIENT
Start: 2021-06-03 | End: 2021-06-06

## 2021-06-03 RX ORDER — SODIUM CHLORIDE 9 MG/ML
1000 INJECTION, SOLUTION INTRAVENOUS
Refills: 0 | Status: DISCONTINUED | OUTPATIENT
Start: 2021-06-03 | End: 2021-06-03

## 2021-06-03 RX ORDER — ACETAMINOPHEN 500 MG
650 TABLET ORAL EVERY 6 HOURS
Refills: 0 | Status: DISCONTINUED | OUTPATIENT
Start: 2021-06-03 | End: 2021-06-06

## 2021-06-03 RX ORDER — ONDANSETRON 8 MG/1
4 TABLET, FILM COATED ORAL EVERY 6 HOURS
Refills: 0 | Status: DISCONTINUED | OUTPATIENT
Start: 2021-06-03 | End: 2021-06-06

## 2021-06-03 RX ORDER — OXYBUTYNIN CHLORIDE 5 MG
5 TABLET ORAL
Refills: 0 | Status: DISCONTINUED | OUTPATIENT
Start: 2021-06-03 | End: 2021-06-03

## 2021-06-03 RX ORDER — TRAZODONE HCL 50 MG
100 TABLET ORAL AT BEDTIME
Refills: 0 | Status: DISCONTINUED | OUTPATIENT
Start: 2021-06-03 | End: 2021-06-06

## 2021-06-03 RX ORDER — DEXAMETHASONE 0.5 MG/5ML
8 ELIXIR ORAL ONCE
Refills: 0 | Status: DISCONTINUED | OUTPATIENT
Start: 2021-06-03 | End: 2021-06-03

## 2021-06-03 RX ORDER — FUROSEMIDE 40 MG
20 TABLET ORAL DAILY
Refills: 0 | Status: DISCONTINUED | OUTPATIENT
Start: 2021-06-03 | End: 2021-06-06

## 2021-06-03 RX ORDER — PANTOPRAZOLE SODIUM 20 MG/1
40 TABLET, DELAYED RELEASE ORAL
Refills: 0 | Status: DISCONTINUED | OUTPATIENT
Start: 2021-06-03 | End: 2021-06-06

## 2021-06-03 RX ORDER — HYDROMORPHONE HYDROCHLORIDE 2 MG/ML
1 INJECTION INTRAMUSCULAR; INTRAVENOUS; SUBCUTANEOUS
Refills: 0 | Status: DISCONTINUED | OUTPATIENT
Start: 2021-06-03 | End: 2021-06-03

## 2021-06-03 RX ORDER — ISOSORBIDE MONONITRATE 60 MG/1
60 TABLET, EXTENDED RELEASE ORAL DAILY
Refills: 0 | Status: DISCONTINUED | OUTPATIENT
Start: 2021-06-03 | End: 2021-06-06

## 2021-06-03 RX ORDER — SODIUM CHLORIDE 9 MG/ML
3 INJECTION INTRAMUSCULAR; INTRAVENOUS; SUBCUTANEOUS EVERY 8 HOURS
Refills: 0 | Status: DISCONTINUED | OUTPATIENT
Start: 2021-06-03 | End: 2021-06-03

## 2021-06-03 RX ORDER — LISINOPRIL 2.5 MG/1
5 TABLET ORAL DAILY
Refills: 0 | Status: DISCONTINUED | OUTPATIENT
Start: 2021-06-03 | End: 2021-06-06

## 2021-06-03 RX ORDER — ONDANSETRON 8 MG/1
4 TABLET, FILM COATED ORAL ONCE
Refills: 0 | Status: DISCONTINUED | OUTPATIENT
Start: 2021-06-03 | End: 2021-06-03

## 2021-06-03 RX ORDER — OXYBUTYNIN CHLORIDE 5 MG
5 TABLET ORAL AT BEDTIME
Refills: 0 | Status: DISCONTINUED | OUTPATIENT
Start: 2021-06-03 | End: 2021-06-06

## 2021-06-03 RX ORDER — HYDROMORPHONE HYDROCHLORIDE 2 MG/ML
30 INJECTION INTRAMUSCULAR; INTRAVENOUS; SUBCUTANEOUS
Refills: 0 | Status: DISCONTINUED | OUTPATIENT
Start: 2021-06-03 | End: 2021-06-04

## 2021-06-03 RX ORDER — NALOXONE HYDROCHLORIDE 4 MG/.1ML
0.1 SPRAY NASAL
Refills: 0 | Status: DISCONTINUED | OUTPATIENT
Start: 2021-06-03 | End: 2021-06-03

## 2021-06-03 RX ORDER — RANOLAZINE 500 MG/1
1000 TABLET, FILM COATED, EXTENDED RELEASE ORAL
Refills: 0 | Status: DISCONTINUED | OUTPATIENT
Start: 2021-06-03 | End: 2021-06-06

## 2021-06-03 RX ORDER — ONDANSETRON 8 MG/1
4 TABLET, FILM COATED ORAL EVERY 6 HOURS
Refills: 0 | Status: DISCONTINUED | OUTPATIENT
Start: 2021-06-03 | End: 2021-06-03

## 2021-06-03 RX ORDER — RANOLAZINE 500 MG/1
500 TABLET, FILM COATED, EXTENDED RELEASE ORAL
Refills: 0 | Status: DISCONTINUED | OUTPATIENT
Start: 2021-06-03 | End: 2021-06-03

## 2021-06-03 RX ORDER — MONTELUKAST 4 MG/1
10 TABLET, CHEWABLE ORAL DAILY
Refills: 0 | Status: DISCONTINUED | OUTPATIENT
Start: 2021-06-03 | End: 2021-06-03

## 2021-06-03 RX ORDER — NALOXONE HYDROCHLORIDE 4 MG/.1ML
0.1 SPRAY NASAL
Refills: 0 | Status: DISCONTINUED | OUTPATIENT
Start: 2021-06-03 | End: 2021-06-06

## 2021-06-03 RX ORDER — ATORVASTATIN CALCIUM 80 MG/1
40 TABLET, FILM COATED ORAL AT BEDTIME
Refills: 0 | Status: DISCONTINUED | OUTPATIENT
Start: 2021-06-03 | End: 2021-06-06

## 2021-06-03 RX ORDER — KIT FOR THE PREPARATION OF TECHNETIUM TC99M SESTAMIBI 1 MG/5ML
INJECTION, POWDER, LYOPHILIZED, FOR SOLUTION PARENTERAL
Refills: 0 | Status: COMPLETED | OUTPATIENT
Start: 2021-06-03

## 2021-06-03 RX ADMIN — MONTELUKAST 10 MILLIGRAM(S): 4 TABLET, CHEWABLE ORAL at 21:52

## 2021-06-03 RX ADMIN — Medication 100 MILLIGRAM(S): at 21:52

## 2021-06-03 RX ADMIN — Medication 180 MILLIGRAM(S): at 21:52

## 2021-06-03 RX ADMIN — HYDROMORPHONE HYDROCHLORIDE 30 MILLILITER(S): 2 INJECTION INTRAMUSCULAR; INTRAVENOUS; SUBCUTANEOUS at 14:35

## 2021-06-03 RX ADMIN — ATORVASTATIN CALCIUM 40 MILLIGRAM(S): 80 TABLET, FILM COATED ORAL at 21:52

## 2021-06-03 RX ADMIN — Medication 100 MILLIGRAM(S): at 09:50

## 2021-06-03 RX ADMIN — HYDROMORPHONE HYDROCHLORIDE 30 MILLILITER(S): 2 INJECTION INTRAMUSCULAR; INTRAVENOUS; SUBCUTANEOUS at 20:31

## 2021-06-03 RX ADMIN — RANOLAZINE 1000 MILLIGRAM(S): 500 TABLET, FILM COATED, EXTENDED RELEASE ORAL at 20:07

## 2021-06-03 RX ADMIN — SENNA PLUS 2 TABLET(S): 8.6 TABLET ORAL at 21:52

## 2021-06-03 RX ADMIN — HYDROMORPHONE HYDROCHLORIDE 1 MILLIGRAM(S): 2 INJECTION INTRAMUSCULAR; INTRAVENOUS; SUBCUTANEOUS at 14:22

## 2021-06-03 RX ADMIN — Medication 5 MILLIGRAM(S): at 21:52

## 2021-06-03 RX ADMIN — Medication 2: at 16:08

## 2021-06-03 RX ADMIN — CYCLOBENZAPRINE HYDROCHLORIDE 10 MILLIGRAM(S): 10 TABLET, FILM COATED ORAL at 21:52

## 2021-06-03 RX ADMIN — Medication 100 MILLIGRAM(S): at 18:09

## 2021-06-03 RX ADMIN — KIT FOR THE PREPARATION OF TECHNETIUM TC99M SESTAMIBI 0: 1 INJECTION, POWDER, LYOPHILIZED, FOR SOLUTION PARENTERAL at 00:00

## 2021-06-03 RX ADMIN — HYDROMORPHONE HYDROCHLORIDE 1 MILLIGRAM(S): 2 INJECTION INTRAMUSCULAR; INTRAVENOUS; SUBCUTANEOUS at 14:35

## 2021-06-03 NOTE — PHYSICAL THERAPY INITIAL EVALUATION ADULT - ADDITIONAL COMMENTS
Pt reports living in a private home with her parents and children. Pt has no steps to enter and an elevator to her downstairs apartment within the house. If pt chooses to do the stairs its a full flight with a handrail. Pt independent prior to admission. Owns no DME. Pt drives & works. Family is able to assist upon d/c,

## 2021-06-03 NOTE — PHYSICAL THERAPY INITIAL EVALUATION ADULT - GENERAL OBSERVATIONS, REHAB EVAL
Pt received in bed in PACU, + IV Loc, +Tele//BP monitoring, posada, hemovac, bilateral SCDs, breathing on RA in NAD, in 2/10 low back pain, agreeable to PT evaluation

## 2021-06-04 DIAGNOSIS — K21.9 GASTRO-ESOPHAGEAL REFLUX DISEASE WITHOUT ESOPHAGITIS: ICD-10-CM

## 2021-06-04 DIAGNOSIS — E11.9 TYPE 2 DIABETES MELLITUS WITHOUT COMPLICATIONS: ICD-10-CM

## 2021-06-04 DIAGNOSIS — I10 ESSENTIAL (PRIMARY) HYPERTENSION: ICD-10-CM

## 2021-06-04 DIAGNOSIS — I20.1 ANGINA PECTORIS WITH DOCUMENTED SPASM: ICD-10-CM

## 2021-06-04 DIAGNOSIS — F32.9 MAJOR DEPRESSIVE DISORDER, SINGLE EPISODE, UNSPECIFIED: ICD-10-CM

## 2021-06-04 DIAGNOSIS — E78.00 PURE HYPERCHOLESTEROLEMIA, UNSPECIFIED: ICD-10-CM

## 2021-06-04 LAB
ANION GAP SERPL CALC-SCNC: 8 MMOL/L — SIGNIFICANT CHANGE UP (ref 5–17)
BASOPHILS # BLD AUTO: 0.01 K/UL — SIGNIFICANT CHANGE UP (ref 0–0.2)
BASOPHILS NFR BLD AUTO: 0.1 % — SIGNIFICANT CHANGE UP (ref 0–2)
BUN SERPL-MCNC: 7.4 MG/DL — LOW (ref 8–20)
CALCIUM SERPL-MCNC: 8.5 MG/DL — LOW (ref 8.6–10.2)
CHLORIDE SERPL-SCNC: 104 MMOL/L — SIGNIFICANT CHANGE UP (ref 98–107)
CO2 SERPL-SCNC: 27 MMOL/L — SIGNIFICANT CHANGE UP (ref 22–29)
COVID-19 SPIKE DOMAIN AB INTERP: POSITIVE
COVID-19 SPIKE DOMAIN ANTIBODY RESULT: 154 U/ML — HIGH
CREAT SERPL-MCNC: 0.53 MG/DL — SIGNIFICANT CHANGE UP (ref 0.5–1.3)
EOSINOPHIL # BLD AUTO: 0 K/UL — SIGNIFICANT CHANGE UP (ref 0–0.5)
EOSINOPHIL NFR BLD AUTO: 0 % — SIGNIFICANT CHANGE UP (ref 0–6)
GLUCOSE BLDC GLUCOMTR-MCNC: 119 MG/DL — HIGH (ref 70–99)
GLUCOSE BLDC GLUCOMTR-MCNC: 138 MG/DL — HIGH (ref 70–99)
GLUCOSE BLDC GLUCOMTR-MCNC: 154 MG/DL — HIGH (ref 70–99)
GLUCOSE BLDC GLUCOMTR-MCNC: 165 MG/DL — HIGH (ref 70–99)
GLUCOSE SERPL-MCNC: 126 MG/DL — HIGH (ref 70–99)
HCT VFR BLD CALC: 32.1 % — LOW (ref 34.5–45)
HGB BLD-MCNC: 10.7 G/DL — LOW (ref 11.5–15.5)
IMM GRANULOCYTES NFR BLD AUTO: 0.6 % — SIGNIFICANT CHANGE UP (ref 0–1.5)
LYMPHOCYTES # BLD AUTO: 0.92 K/UL — LOW (ref 1–3.3)
LYMPHOCYTES # BLD AUTO: 9.2 % — LOW (ref 13–44)
MCHC RBC-ENTMCNC: 29.4 PG — SIGNIFICANT CHANGE UP (ref 27–34)
MCHC RBC-ENTMCNC: 33.3 GM/DL — SIGNIFICANT CHANGE UP (ref 32–36)
MCV RBC AUTO: 88.2 FL — SIGNIFICANT CHANGE UP (ref 80–100)
MONOCYTES # BLD AUTO: 0.73 K/UL — SIGNIFICANT CHANGE UP (ref 0–0.9)
MONOCYTES NFR BLD AUTO: 7.3 % — SIGNIFICANT CHANGE UP (ref 2–14)
NEUTROPHILS # BLD AUTO: 8.31 K/UL — HIGH (ref 1.8–7.4)
NEUTROPHILS NFR BLD AUTO: 82.8 % — HIGH (ref 43–77)
PLATELET # BLD AUTO: 225 K/UL — SIGNIFICANT CHANGE UP (ref 150–400)
POTASSIUM SERPL-MCNC: 4 MMOL/L — SIGNIFICANT CHANGE UP (ref 3.5–5.3)
POTASSIUM SERPL-SCNC: 4 MMOL/L — SIGNIFICANT CHANGE UP (ref 3.5–5.3)
RBC # BLD: 3.64 M/UL — LOW (ref 3.8–5.2)
RBC # FLD: 11.9 % — SIGNIFICANT CHANGE UP (ref 10.3–14.5)
SARS-COV-2 IGG+IGM SERPL QL IA: 154 U/ML — HIGH
SARS-COV-2 IGG+IGM SERPL QL IA: POSITIVE
SODIUM SERPL-SCNC: 139 MMOL/L — SIGNIFICANT CHANGE UP (ref 135–145)
WBC # BLD: 10.03 K/UL — SIGNIFICANT CHANGE UP (ref 3.8–10.5)
WBC # FLD AUTO: 10.03 K/UL — SIGNIFICANT CHANGE UP (ref 3.8–10.5)

## 2021-06-04 PROCEDURE — 99222 1ST HOSP IP/OBS MODERATE 55: CPT

## 2021-06-04 RX ORDER — HYDROMORPHONE HYDROCHLORIDE 2 MG/ML
0.5 INJECTION INTRAMUSCULAR; INTRAVENOUS; SUBCUTANEOUS
Refills: 0 | Status: DISCONTINUED | OUTPATIENT
Start: 2021-06-04 | End: 2021-06-06

## 2021-06-04 RX ORDER — HYDROMORPHONE HYDROCHLORIDE 2 MG/ML
4 INJECTION INTRAMUSCULAR; INTRAVENOUS; SUBCUTANEOUS
Refills: 0 | Status: DISCONTINUED | OUTPATIENT
Start: 2021-06-04 | End: 2021-06-06

## 2021-06-04 RX ORDER — OXYCODONE HYDROCHLORIDE 5 MG/1
10 TABLET ORAL
Refills: 0 | Status: DISCONTINUED | OUTPATIENT
Start: 2021-06-04 | End: 2021-06-06

## 2021-06-04 RX ORDER — OXYCODONE HYDROCHLORIDE 5 MG/1
5 TABLET ORAL
Refills: 0 | Status: DISCONTINUED | OUTPATIENT
Start: 2021-06-04 | End: 2021-06-06

## 2021-06-04 RX ADMIN — Medication 20 MILLIGRAM(S): at 05:00

## 2021-06-04 RX ADMIN — Medication 1: at 17:10

## 2021-06-04 RX ADMIN — HYDROMORPHONE HYDROCHLORIDE 30 MILLILITER(S): 2 INJECTION INTRAMUSCULAR; INTRAVENOUS; SUBCUTANEOUS at 07:27

## 2021-06-04 RX ADMIN — Medication 180 MILLIGRAM(S): at 08:16

## 2021-06-04 RX ADMIN — PANTOPRAZOLE SODIUM 40 MILLIGRAM(S): 20 TABLET, DELAYED RELEASE ORAL at 05:00

## 2021-06-04 RX ADMIN — RANOLAZINE 1000 MILLIGRAM(S): 500 TABLET, FILM COATED, EXTENDED RELEASE ORAL at 05:01

## 2021-06-04 RX ADMIN — Medication 180 MILLIGRAM(S): at 21:24

## 2021-06-04 RX ADMIN — Medication 5 MILLIGRAM(S): at 21:24

## 2021-06-04 RX ADMIN — Medication 100 MILLIGRAM(S): at 01:03

## 2021-06-04 RX ADMIN — CYCLOBENZAPRINE HYDROCHLORIDE 10 MILLIGRAM(S): 10 TABLET, FILM COATED ORAL at 12:28

## 2021-06-04 RX ADMIN — Medication 100 MILLIGRAM(S): at 12:26

## 2021-06-04 RX ADMIN — ATORVASTATIN CALCIUM 40 MILLIGRAM(S): 80 TABLET, FILM COATED ORAL at 21:24

## 2021-06-04 RX ADMIN — HYDROMORPHONE HYDROCHLORIDE 30 MILLILITER(S): 2 INJECTION INTRAMUSCULAR; INTRAVENOUS; SUBCUTANEOUS at 01:00

## 2021-06-04 RX ADMIN — LISINOPRIL 5 MILLIGRAM(S): 2.5 TABLET ORAL at 05:00

## 2021-06-04 RX ADMIN — Medication 100 MILLIGRAM(S): at 17:10

## 2021-06-04 RX ADMIN — Medication 100 MILLIGRAM(S): at 21:25

## 2021-06-04 RX ADMIN — RANOLAZINE 1000 MILLIGRAM(S): 500 TABLET, FILM COATED, EXTENDED RELEASE ORAL at 17:10

## 2021-06-04 RX ADMIN — HYDROMORPHONE HYDROCHLORIDE 4 MILLIGRAM(S): 2 INJECTION INTRAMUSCULAR; INTRAVENOUS; SUBCUTANEOUS at 17:14

## 2021-06-04 RX ADMIN — MONTELUKAST 10 MILLIGRAM(S): 4 TABLET, CHEWABLE ORAL at 21:24

## 2021-06-04 RX ADMIN — SENNA PLUS 2 TABLET(S): 8.6 TABLET ORAL at 21:25

## 2021-06-04 RX ADMIN — CYCLOBENZAPRINE HYDROCHLORIDE 10 MILLIGRAM(S): 10 TABLET, FILM COATED ORAL at 05:00

## 2021-06-04 RX ADMIN — HYDROMORPHONE HYDROCHLORIDE 30 MILLILITER(S): 2 INJECTION INTRAMUSCULAR; INTRAVENOUS; SUBCUTANEOUS at 05:05

## 2021-06-04 RX ADMIN — CYCLOBENZAPRINE HYDROCHLORIDE 10 MILLIGRAM(S): 10 TABLET, FILM COATED ORAL at 21:26

## 2021-06-04 RX ADMIN — ISOSORBIDE MONONITRATE 60 MILLIGRAM(S): 60 TABLET, EXTENDED RELEASE ORAL at 12:27

## 2021-06-04 RX ADMIN — BUPROPION HYDROCHLORIDE 150 MILLIGRAM(S): 150 TABLET, EXTENDED RELEASE ORAL at 12:26

## 2021-06-04 NOTE — DISCHARGE NOTE PROVIDER - NSDCMRMEDTOKEN_GEN_ALL_CORE_FT
aspirin 81 mg oral tablet: orally once a day  dilTIAZem 180 mg/24 hours oral tablet, extended release: orally 2 times a day  furosemide 20 mg oral tablet: 1 tab(s) orally once a day  isosorbide mononitrate 60 mg oral tablet, extended release: 1 tab(s) orally once a day (in the morning)  Januvia 100 mg oral tablet: 1 tab(s) orally once a day (at bedtime)  Lipitor 40 mg oral tablet: 1 tab(s) orally once a day  nitroglycerin 0.4 mg sublingual tablet:   oxycodone-acetaminophen 5 mg-325 mg oral tablet:   pantoprazole 40 mg oral delayed release tablet: 1 tab(s) orally once a day  Plavix 75 mg oral tablet: 1 tab(s) orally once a day  Pristiq: 100 milligram(s) orally once a day  ramipril 1.25 mg oral tablet: orally once a day  Ranexa 500 mg oral tablet, extended release: 1 tab(s) orally 2 times a day  traZODone 100 mg oral tablet: 2 tab(s) orally once a day (at bedtime)  VESIcare 5 mg oral tablet: 1 tab(s) orally once a day  Wellbutrin: 150 milligram(s) orally once a day  Xanax 0.5 mg oral tablet: 1 tab(s) orally 3 times a day, As Needed   acetaminophen 325 mg oral tablet: 2 tab(s) orally every 6 hours, As needed, for mild-moderate pain  aspirin 81 mg oral tablet: orally once a day  cyclobenzaprine 10 mg oral tablet: 1 tab(s) orally every 8 hours, As Needed -for muscle spasm   dilTIAZem 180 mg/24 hours oral tablet, extended release: orally 2 times a day  furosemide 20 mg oral tablet: 1 tab(s) orally once a day  isosorbide mononitrate 60 mg oral tablet, extended release: 1 tab(s) orally once a day (in the morning)  Januvia 100 mg oral tablet: 1 tab(s) orally once a day (at bedtime)  Lipitor 40 mg oral tablet: 1 tab(s) orally once a day  nitroglycerin 0.4 mg sublingual tablet:   oxyCODONE 5 mg oral tablet: 1-2 tab(s) orally every 4 to 6 hours, As Needed -for severe pain MDD:8  pantoprazole 40 mg oral delayed release tablet: 1 tab(s) orally once a day  Plavix 75 mg oral tablet: 1 tab(s) orally once a day  Pristiq: 100 milligram(s) orally once a day  ramipril 1.25 mg oral tablet: orally once a day  Ranexa 500 mg oral tablet, extended release: 1 tab(s) orally 2 times a day  traZODone 100 mg oral tablet: 2 tab(s) orally once a day (at bedtime)  VESIcare 5 mg oral tablet: 1 tab(s) orally once a day  Wellbutrin: 150 milligram(s) orally once a day  Xanax 0.5 mg oral tablet: 1 tab(s) orally 3 times a day, As Needed

## 2021-06-04 NOTE — DISCHARGE NOTE PROVIDER - NSDCFUADDAPPT_GEN_ALL_CORE_FT
* We recommend that you call and schedule a follow up appointment within 2-4 weeks with your primary care physician for repeat blood work (CBC and BMP) for post hospital discharge follow-up care.  * Call your surgeon if you have increased redness/pain/drainage or fever. Return to ER for shortness of breath/calf tenderness.   * We recommend that you call and schedule a follow up appointment within 2-4 weeks with your primary care physician for repeat blood work (CBC and BMP) for post hospital discharge follow-up care.  * Call your surgeon if you have increased redness/pain/drainage or fever. Return to ER for shortness of breath/calf tenderness.    Please avoid narcotic pain meds, do exercise and low caloric diet and see your doctor to ask for sleep study.

## 2021-06-04 NOTE — DISCHARGE NOTE PROVIDER - NSDCFUADDINST_GEN_ALL_CORE_FT
Follow-up with Dr. Rothman within 7-10 days. Leave dressing in place until office follow-up. Pain medications as indicated and titrated to patient's needs. Ambulate with assistance of walker. Contact office if the wound becomes red, opens or discharge increases.

## 2021-06-04 NOTE — CONSULT NOTE ADULT - PROBLEM SELECTOR RECOMMENDATION 9
S/p laminectomy L5-S1 and posterior lumbar interbody fusion, posterior spine fusion POD#1.  Pain control - currently on PCA.  PT.  Antibiotics, wound care, hemovac and DVT prophylaxis as per Ortho.  Incentive spirometry.  Avoid opioid induced constipation.

## 2021-06-04 NOTE — CONSULT NOTE ADULT - ASSESSMENT
45 yo F with PMH of HTN, HLD, DM2, non-obstructive CAD, coronary artery vasospasm, GERD, depression, former smoker, presents with c/o severe back pain for 1 year after a fall, s/p injections with limited relief, controlled with Percocet prn, now s/p laminectomy L5-S1 and posterior lumbar interbody fusion, posterior spine fusion POD#1.

## 2021-06-04 NOTE — DISCHARGE NOTE PROVIDER - HOSPITAL COURSE
Patient was admitted for elective posterior spinal fusion on 6/3 for failed conservative treatment of persistent lower back pain, The hospital post operative course was uneventful.  The surgical dressing was changed and the drain was removed uneventfully.   PT/OT worked with patient for gait training. Pain was now adequately controlled and patient was discharged home in stable condition.  Chronic medical conditions were treated during the hospital stay.

## 2021-06-04 NOTE — PROGRESS NOTE ADULT - SUBJECTIVE AND OBJECTIVE BOX
KATHRYN PARRA  [id number]    POST OPERATIVE DAY #: 1  STATUS POST: TLIF L5-S1    Late entry. Patient seen around 7:45am.                      SUBJECTIVE: Patient seen and examined at bedside, resting comfortably receiving O2 via nasal cannula. Patient on PCA. Patients pain is being well controlled with current prescribed pain medications. Patient currently has posada at time of exam. Patient states to be participating with PT. Patient denies fever, chills, CP, SOB, numbness, tingling, headache, lightheadedness.     OBJECTIVE:     Vital Signs Last 24 Hrs  T(C): 36.9 (04 Jun 2021 08:00), Max: 37.2 (03 Jun 2021 14:00)  T(F): 98.4 (04 Jun 2021 08:00), Max: 99 (03 Jun 2021 14:00)  HR: 70 (04 Jun 2021 08:00) (70 - 86)  BP: 120/71 (04 Jun 2021 08:00) (93/51 - 120/71)  BP(mean): 87 (04 Jun 2021 08:00) (87 - 87)  RR: 16 (04 Jun 2021 08:00) (14 - 18)  SpO2: 97% (04 Jun 2021 08:00) (95% - 98%)  I&O's Summary    03 Jun 2021 07:01  -  04 Jun 2021 07:00  --------------------------------------------------------  IN: 3035 mL / OUT: 4315 mL / NET: -1280 mL        Constitutional: Alert, responsive, in no acute distress.   Spine:          Dressing: clean, dry, intact with no bleeding or discharge noted on dressing. No erythema noted to visible skin.          Drains:  present and intact. Bloody discharge noted in cannister. 140cc over 24 hours and 110cc over 12 hours.          Sensation:            Lower extremity                         sp         dp         saph       gamez         tibial                                                R          +           +             +           +          +                                               L           +           +             +           +          +                  Motor exam:            Lower extremity                       HF(l2)   KE(l3)    TA(l4)   EHL(l5)   GS(s1)                                                 R        5/5        5/5        5/5       5/5         5/5                                               L         5/5        5/5       5/5       5/5          5/5                                                         Vascular:  warm well perfused b/l; 2+ DP pulse b/l. Capillary refill <3 seconds b/l.                                                               LABS:                        10.7   10.03 )-----------( 225      ( 04 Jun 2021 06:27 )             32.1       06-04    139  |  104  |  7.4<L>  ----------------------------<  126<H>  4.0   |  27.0  |  0.53    Ca    8.5<L>      04 Jun 2021 06:27        A/P :  46y Female S/P TLIF L5-S1      POD# 1  -    Pain control as clinically indicated   -    Discussed with Medicine - okay to move to regular floor on monitored bed (2 [address])  -    PCA d/c'd - begin oral pain medications   -    Discontinue posada after PT   -    PT   -    DVT ppx: SCDs  -    Continue antibiotics while drain in place   -    Monitor Drain Output     -    Weight bearing status: WBAT   -    Dispo planning - home  KATHRYN PARRA  [id number]    POST OPERATIVE DAY #: 1  STATUS POST: TLIF L5-S1    Late entry. Patient seen around 7:45am.                      SUBJECTIVE: Patient seen and examined at bedside, resting comfortably receiving O2 via nasal cannula. Patient on PCA. Patients pain is being well controlled with current prescribed pain medications. Patient currently has posada at time of exam. Patient states to be participating with PT. Patient states to have left sided weakness. Patient denies fever, chills, CP, SOB, numbness, tingling, headache, lightheadedness.     OBJECTIVE:     Vital Signs Last 24 Hrs  T(C): 36.9 (04 Jun 2021 08:00), Max: 37.2 (03 Jun 2021 14:00)  T(F): 98.4 (04 Jun 2021 08:00), Max: 99 (03 Jun 2021 14:00)  HR: 70 (04 Jun 2021 08:00) (70 - 86)  BP: 120/71 (04 Jun 2021 08:00) (93/51 - 120/71)  BP(mean): 87 (04 Jun 2021 08:00) (87 - 87)  RR: 16 (04 Jun 2021 08:00) (14 - 18)  SpO2: 97% (04 Jun 2021 08:00) (95% - 98%)  I&O's Summary    03 Jun 2021 07:01  -  04 Jun 2021 07:00  --------------------------------------------------------  IN: 3035 mL / OUT: 4315 mL / NET: -1280 mL        Constitutional: Alert, responsive, in no acute distress.   Spine:          Dressing: clean, dry, intact with no bleeding or discharge noted on dressing. No erythema noted to visible skin.          Drains:  present and intact. Bloody discharge noted in cannister. 140cc over 24 hours and 110cc over 12 hours.          Sensation:            Lower extremity                         sp         dp         saph       gaemz         tibial                                                R          +           +             +           +          +                                               L           +           +             +           +          +                  Motor exam:            Lower extremity                       HF(l2)   KE(l3)    TA(l4)   EHL(l5)   GS(s1)                                                 R        5/5        5/5        5/5       5/5         5/5                                               L         4/5        4/5       5/5       5/5 5/5                                                         Vascular:  warm well perfused b/l; 2+ DP pulse b/l. Capillary refill <3 seconds b/l.                                                               LABS:                        10.7   10.03 )-----------( 225      ( 04 Jun 2021 06:27 )             32.1       06-04    139  |  104  |  7.4<L>  ----------------------------<  126<H>  4.0   |  27.0  |  0.53    Ca    8.5<L>      04 Jun 2021 06:27        A/P :  46y Female S/P TLIF L5-S1      POD# 1  -    Pain control as clinically indicated   -    Discussed with Medicine - okay to move to regular floor on monitored bed (2 [address])  -    PCA d/c'd - begin oral pain medications   -    Discontinue posada after PT   -    PT   -    DVT ppx: SCDs  -    Continue antibiotics while drain in place   -    Monitor Drain Output     -    Weight bearing status: WBAT   -    Dispo planning - home

## 2021-06-04 NOTE — CONSULT NOTE ADULT - SUBJECTIVE AND OBJECTIVE BOX
PMD: Dr. Benavidez  Cardiologist: Dr. Mock    CC: Back pain    HPI:  45 yo F with PMH of HTN, HLD, DM2, non-obstructive CAD, coronary artery vasospasm, GERD, depression, former smoker, presents with c/o severe back pain for 1 year after a fall, s/p injections with limited relief, controlled with Percocet prn, now s/p laminectomy L5-S1 and posterior lumbar interbody fusion, posterior spine fusion POD#1. Tele Monitor  - SR 70s, no ectopy overnight.      PAST MEDICAL & SURGICAL HISTORY:  HTN (hypertension)    Coronary vasospasm    MI (myocardial infarction)    Diabetes    High cholesterol    Asthma    Spondylolisthesis, lumbosacral region    GERD (gastroesophageal reflux disease)    Former smoker    H/O spinal fusion  cervical    S/P arthroscopic surgery of left knee    S/P arthroscopy of right shoulder    S/P  section  ,     History of cholecystectomy      S/P arthroscopic surgery of right knee    FAMILY HISTORY:  Family history of diabetes mellitus (Father, Mother)    Family history of early CAD (Father, Mother)    FH: Crohn's disease (Sibling)    FH: heart attack (Grandparent)    Family history of CABG (Mother)    SOCIAL HISTORY:  Tobacco - Quit 2 years ago; 0.5PPD [id number] on and off  ETOH - Rarely  Drug use - Denies    ALLERGIES:  Lorkeanud - Hives  Ceclor - Hives  Cipro - Rash    HOME MEDICATIONS:  aspirin 81 mg oral tablet: orally once a day (2021 08:07)  dilTIAZem 180 mg/24 hours oral tablet, extended release: orally 2 times a day (2021 08:07)  furosemide 20 mg oral tablet: 1 tab(s) orally once a day (2021 08:07)  isosorbide mononitrate 60 mg oral tablet, extended release: 1 tab(s) orally once a day (in the morning) (2021 08:07)  Januvia 100 mg oral tablet: 1 tab(s) orally once a day (at bedtime) (2021 08:07)  Lipitor 40 mg oral tablet: 1 tab(s) orally once a day (2021 08:07)  nitroglycerin 0.4 mg sublingual tablet:  (2021 08:07)  oxycodone-acetaminophen 5 mg-325 mg oral tablet:  (2021 08:07)  pantoprazole 40 mg oral delayed release tablet: 1 tab(s) orally once a day (2021 08:07)  Plavix 75 mg oral tablet: 1 tab(s) orally once a day (2021 08:07)  Pristiq: 100 milligram(s) orally once a day (2021 08:07)  ramipril 1.25 mg oral tablet: orally once a day (2021 08:07)  Ranexa 1000 mg oral tablet, extended release: 1 tab(s) orally 2 times a day (2021 08:07)  traZODone 150 mg oral tablet: 1 tab(s) orally once a day (at bedtime) (2021 08:07)  VESIcare 5 mg oral tablet: 1 tab(s) orally once a day (2021 08:07)  Wellbutrin: 150 milligram(s) orally once a day (2021 08:07)  Xanax 0.5 mg oral tablet: 1 tab(s) orally 3 times a day, As Needed (2021 08:07)      MEDICATIONS  (STANDING):  atorvastatin 40 milliGRAM(s) Oral at bedtime  buPROPion XL (24-Hour) . 150 milliGRAM(s) Oral daily  clindamycin IVPB 900 milliGRAM(s) IV Intermittent every 8 hours  cyclobenzaprine 10 milliGRAM(s) Oral every 8 hours  dextrose 40% Gel 15 Gram(s) Oral once  dextrose 40% Gel 15 Gram(s) Oral once  dextrose 5%. 1000 milliLiter(s) (50 mL/Hr) IV Continuous <Continuous>  dextrose 5%. 1000 milliLiter(s) (100 mL/Hr) IV Continuous <Continuous>  dextrose 50% Injectable 25 Gram(s) IV Push once  dextrose 50% Injectable 12.5 Gram(s) IV Push once  dextrose 50% Injectable 25 Gram(s) IV Push once  dextrose 50% Injectable 12.5 Gram(s) IV Push once  dextrose 50% Injectable 25 Gram(s) IV Push once  diltiazem    milliGRAM(s) Oral <User Schedule>  furosemide    Tablet 20 milliGRAM(s) Oral daily  glucagon  Injectable 1 milliGRAM(s) IntraMuscular once  glucagon  Injectable 1 milliGRAM(s) IntraMuscular once  HYDROmorphone PCA (1 mG/mL) 30 milliLiter(s) PCA Continuous PCA Continuous  insulin lispro (ADMELOG) corrective regimen sliding scale   SubCutaneous three times a day before meals  isosorbide   mononitrate ER Tablet (IMDUR) 60 milliGRAM(s) Oral daily  lactated ringers. 1000 milliLiter(s) (100 mL/Hr) IV Continuous <Continuous>  lisinopril 5 milliGRAM(s) Oral daily  montelukast 10 milliGRAM(s) Oral at bedtime  oxybutynin 5 milliGRAM(s) Oral at bedtime  pantoprazole    Tablet 40 milliGRAM(s) Oral before breakfast  ranolazine 1000 milliGRAM(s) Oral two times a day  senna 2 Tablet(s) Oral at bedtime  traZODone 100 milliGRAM(s) Oral at bedtime    MEDICATIONS  (PRN):  acetaminophen   Tablet .. 650 milliGRAM(s) Oral every 6 hours PRN Temp greater or equal to 38.5C (101.3F)  ALPRAZolam 0.5 milliGRAM(s) Oral three times a day PRN anxiety  naloxone Injectable 0.1 milliGRAM(s) IV Push every 3 minutes PRN For ANY of the following changes in patient status:  A. RR LESS THAN 10 breaths per minute, B. Oxygen saturation LESS THAN 90%, C. Sedation score of 6  ondansetron   Disintegrating Tablet 4 milliGRAM(s) Oral every 6 hours PRN Nausea  ondansetron Injectable 4 milliGRAM(s) IV Push every 6 hours PRN Nausea      REVIEW OF SYSTEMS:  CONSTITUTIONAL: No fever, weight loss, or fatigue  RESPIRATORY: No cough, wheezing, or chills; No shortness of breath  CARDIOVASCULAR: No chest pain, palpitations, dizziness, or leg swelling  GASTROINTESTINAL: No abdominal or epigastric pain. No nausea or vomiting; No diarrhea or constipation.   GENITOURINARY: No dysuria, frequency, hematuria, or incontinence  NEUROLOGICAL: No headaches, memory loss, loss of strength, numbness, or tremors  SKIN: No itching, burning, rashes, or lesions   MUSCULOSKELETAL: Back pain  PSYCHIATRIC: No depression, anxiety, mood swings, or difficulty sleeping        Vital Signs Last 24 Hrs  T(C): 36.9 (2021 08:00), Max: 37.2 (2021 14:00)  T(F): 98.4 (2021 08:00), Max: 99 (2021 14:00)  HR: 70 (2021 08:00) (70 - 86)  BP: 120/71 (2021 08:00) (93/51 - 120/71)  BP(mean): 87 (2021 08:00) (87 - 87)  RR: 16 (2021 08:00) (14 - 18)  SpO2: 97% (2021 08:00) (95% - 98%)      PHYSICAL EXAM:  GENERAL: NAD  HEAD:  Atraumatic, Normocephalic  NECK: Supple, No JVD, Normal thyroid  NERVOUS SYSTEM:  Alert & Oriented X3, Good concentration; Motor Strength 5/5 B/L upper and lower extremities  CHEST/LUNG: Clear to auscultation bilaterally  HEART: Regular rate and rhythm; No murmurs, rubs, or gallops  ABDOMEN: Soft, Nontender, Nondistended; Bowel sounds present  EXTREMITIES:  2+ Peripheral Pulses, No clubbing, cyanosis, or edema  SKIN: lower back dressing clean and dry with hemovac in place        LABS:                        10.7   10.03 )-----------( 225      ( 2021 06:27 )             32.1     06-04    139  |  104  |  7.4<L>  ----------------------------<  126<H>  4.0   |  27.0  |  0.53    Ca    8.5<L>      2021 06:27

## 2021-06-04 NOTE — CONSULT NOTE ADULT - ATTENDING COMMENTS
Patient seen and examined , s/p lumbar fusion POD # 1 ,   pain well controlled , no n/v , voiding without difficulty     Vital Signs Last 24 Hrs  T(C): 36.9 (04 Jun 2021 08:00), Max: 37.1 (03 Jun 2021 16:00)  T(F): 98.4 (04 Jun 2021 08:00), Max: 98.8 (03 Jun 2021 16:00)  HR: 70 (04 Jun 2021 12:00) (70 - 78)  BP: 122/70 (04 Jun 2021 12:00) (98/54 - 122/70)  BP(mean): 87 (04 Jun 2021 08:00) (87 - 87)  RR: 16 (04 Jun 2021 12:00) (15 - 18)  SpO2: 98% (04 Jun 2021 12:00) (95% - 98%)    Lungs: CTA B/L   CVS : S1S2 + , no rubs , no murmurs   Back - dry dressing + , drain +   I&O's Detail    03 Jun 2021 07:01  -  04 Jun 2021 07:00  --------------------------------------------------------  IN:    IV PiggyBack: 100 mL    Lactated Ringers: 1100 mL    Lactated Ringers: 875 mL    Oral Fluid: 960 mL  Total IN: 3035 mL    OUT:    Drain (mL): 140 mL    Indwelling Catheter - Urethral (mL): 4175 mL  Total OUT: 4315 mL    Total NET: -1280 mL      04 Jun 2021 07:01  -  04 Jun 2021 15:36  --------------------------------------------------------  IN:    Oral Fluid: 1280 mL  Total IN: 1280 mL    OUT:    Indwelling Catheter - Urethral (mL): 3100 mL  Total OUT: 3100 mL    Total NET: -1820 mL    Patient with Hx of Angina - continue current mgm , no events noted ,   patient is asymptomatic , OK to transfer to [address] [place], [place] [address]ey - remove , TOV in progress     Above noted and reviewed with NP ,   agree with above .   Continue pain mgmt / PT/OT .   Dressing / drain as per ortho team   Acute blood lost anemia - secondary to surgical blood lost -  asymptomatic.   Follow labs in am .   Thank you for the courtesy of this consult ,   will follow .   Dr Aguiar will take over in am .

## 2021-06-04 NOTE — DISCHARGE NOTE PROVIDER - CARE PROVIDER_API CALL
Manas Rothman  Orthopedic Surgery  [address]  [place] [id number]  Phone: [phone]  Fax: [phone]  Follow Up Time:

## 2021-06-05 LAB
ANION GAP SERPL CALC-SCNC: 9 MMOL/L — SIGNIFICANT CHANGE UP (ref 5–17)
BASOPHILS # BLD AUTO: 0.03 K/UL — SIGNIFICANT CHANGE UP (ref 0–0.2)
BASOPHILS NFR BLD AUTO: 0.3 % — SIGNIFICANT CHANGE UP (ref 0–2)
BUN SERPL-MCNC: 10.2 MG/DL — SIGNIFICANT CHANGE UP (ref 8–20)
CALCIUM SERPL-MCNC: 8.2 MG/DL — LOW (ref 8.6–10.2)
CHLORIDE SERPL-SCNC: 102 MMOL/L — SIGNIFICANT CHANGE UP (ref 98–107)
CO2 SERPL-SCNC: 28 MMOL/L — SIGNIFICANT CHANGE UP (ref 22–29)
CREAT SERPL-MCNC: 0.61 MG/DL — SIGNIFICANT CHANGE UP (ref 0.5–1.3)
EOSINOPHIL # BLD AUTO: 0.1 K/UL — SIGNIFICANT CHANGE UP (ref 0–0.5)
EOSINOPHIL NFR BLD AUTO: 1.1 % — SIGNIFICANT CHANGE UP (ref 0–6)
GLUCOSE BLDC GLUCOMTR-MCNC: 137 MG/DL — HIGH (ref 70–99)
GLUCOSE BLDC GLUCOMTR-MCNC: 142 MG/DL — HIGH (ref 70–99)
GLUCOSE BLDC GLUCOMTR-MCNC: 177 MG/DL — HIGH (ref 70–99)
GLUCOSE BLDC GLUCOMTR-MCNC: 213 MG/DL — HIGH (ref 70–99)
GLUCOSE SERPL-MCNC: 147 MG/DL — HIGH (ref 70–99)
HCT VFR BLD CALC: 31 % — LOW (ref 34.5–45)
HGB BLD-MCNC: 10.5 G/DL — LOW (ref 11.5–15.5)
IMM GRANULOCYTES NFR BLD AUTO: 0.7 % — SIGNIFICANT CHANGE UP (ref 0–1.5)
LYMPHOCYTES # BLD AUTO: 2.19 K/UL — SIGNIFICANT CHANGE UP (ref 1–3.3)
LYMPHOCYTES # BLD AUTO: 24.8 % — SIGNIFICANT CHANGE UP (ref 13–44)
MCHC RBC-ENTMCNC: 29.8 PG — SIGNIFICANT CHANGE UP (ref 27–34)
MCHC RBC-ENTMCNC: 33.9 GM/DL — SIGNIFICANT CHANGE UP (ref 32–36)
MCV RBC AUTO: 88.1 FL — SIGNIFICANT CHANGE UP (ref 80–100)
MONOCYTES # BLD AUTO: 0.98 K/UL — HIGH (ref 0–0.9)
MONOCYTES NFR BLD AUTO: 11.1 % — SIGNIFICANT CHANGE UP (ref 2–14)
NEUTROPHILS # BLD AUTO: 5.47 K/UL — SIGNIFICANT CHANGE UP (ref 1.8–7.4)
NEUTROPHILS NFR BLD AUTO: 62 % — SIGNIFICANT CHANGE UP (ref 43–77)
PLATELET # BLD AUTO: 197 K/UL — SIGNIFICANT CHANGE UP (ref 150–400)
POTASSIUM SERPL-MCNC: 3.5 MMOL/L — SIGNIFICANT CHANGE UP (ref 3.5–5.3)
POTASSIUM SERPL-SCNC: 3.5 MMOL/L — SIGNIFICANT CHANGE UP (ref 3.5–5.3)
RBC # BLD: 3.52 M/UL — LOW (ref 3.8–5.2)
RBC # FLD: 12.1 % — SIGNIFICANT CHANGE UP (ref 10.3–14.5)
SODIUM SERPL-SCNC: 139 MMOL/L — SIGNIFICANT CHANGE UP (ref 135–145)
WBC # BLD: 8.83 K/UL — SIGNIFICANT CHANGE UP (ref 3.8–10.5)
WBC # FLD AUTO: 8.83 K/UL — SIGNIFICANT CHANGE UP (ref 3.8–10.5)

## 2021-06-05 PROCEDURE — 93010 ELECTROCARDIOGRAM REPORT: CPT

## 2021-06-05 PROCEDURE — 99232 SBSQ HOSP IP/OBS MODERATE 35: CPT

## 2021-06-05 PROCEDURE — 71045 X-RAY EXAM CHEST 1 VIEW: CPT | Mod: 26

## 2021-06-05 RX ORDER — ALBUTEROL 90 UG/1
2 AEROSOL, METERED ORAL EVERY 6 HOURS
Refills: 0 | Status: DISCONTINUED | OUTPATIENT
Start: 2021-06-05 | End: 2021-06-06

## 2021-06-05 RX ORDER — ALBUTEROL 90 UG/1
2 AEROSOL, METERED ORAL EVERY 6 HOURS
Refills: 0 | Status: COMPLETED | OUTPATIENT
Start: 2021-06-05 | End: 2022-05-04

## 2021-06-05 RX ORDER — IPRATROPIUM/ALBUTEROL SULFATE 18-103MCG
3 AEROSOL WITH ADAPTER (GRAM) INHALATION EVERY 6 HOURS
Refills: 0 | Status: DISCONTINUED | OUTPATIENT
Start: 2021-06-05 | End: 2021-06-05

## 2021-06-05 RX ADMIN — HYDROMORPHONE HYDROCHLORIDE 4 MILLIGRAM(S): 2 INJECTION INTRAMUSCULAR; INTRAVENOUS; SUBCUTANEOUS at 05:36

## 2021-06-05 RX ADMIN — Medication 100 MILLIGRAM(S): at 17:22

## 2021-06-05 RX ADMIN — ATORVASTATIN CALCIUM 40 MILLIGRAM(S): 80 TABLET, FILM COATED ORAL at 21:13

## 2021-06-05 RX ADMIN — Medication 100 MILLIGRAM(S): at 01:41

## 2021-06-05 RX ADMIN — Medication 100 MILLIGRAM(S): at 21:13

## 2021-06-05 RX ADMIN — HYDROMORPHONE HYDROCHLORIDE 4 MILLIGRAM(S): 2 INJECTION INTRAMUSCULAR; INTRAVENOUS; SUBCUTANEOUS at 17:36

## 2021-06-05 RX ADMIN — SENNA PLUS 2 TABLET(S): 8.6 TABLET ORAL at 21:14

## 2021-06-05 RX ADMIN — CYCLOBENZAPRINE HYDROCHLORIDE 10 MILLIGRAM(S): 10 TABLET, FILM COATED ORAL at 21:13

## 2021-06-05 RX ADMIN — PANTOPRAZOLE SODIUM 40 MILLIGRAM(S): 20 TABLET, DELAYED RELEASE ORAL at 05:36

## 2021-06-05 RX ADMIN — Medication 5 MILLIGRAM(S): at 21:13

## 2021-06-05 RX ADMIN — HYDROMORPHONE HYDROCHLORIDE 4 MILLIGRAM(S): 2 INJECTION INTRAMUSCULAR; INTRAVENOUS; SUBCUTANEOUS at 01:41

## 2021-06-05 RX ADMIN — RANOLAZINE 1000 MILLIGRAM(S): 500 TABLET, FILM COATED, EXTENDED RELEASE ORAL at 17:19

## 2021-06-05 RX ADMIN — HYDROMORPHONE HYDROCHLORIDE 4 MILLIGRAM(S): 2 INJECTION INTRAMUSCULAR; INTRAVENOUS; SUBCUTANEOUS at 18:22

## 2021-06-05 RX ADMIN — Medication 100 MILLIGRAM(S): at 11:50

## 2021-06-05 RX ADMIN — MONTELUKAST 10 MILLIGRAM(S): 4 TABLET, CHEWABLE ORAL at 21:13

## 2021-06-05 RX ADMIN — CYCLOBENZAPRINE HYDROCHLORIDE 10 MILLIGRAM(S): 10 TABLET, FILM COATED ORAL at 05:36

## 2021-06-05 RX ADMIN — RANOLAZINE 1000 MILLIGRAM(S): 500 TABLET, FILM COATED, EXTENDED RELEASE ORAL at 05:36

## 2021-06-05 RX ADMIN — HYDROMORPHONE HYDROCHLORIDE 4 MILLIGRAM(S): 2 INJECTION INTRAMUSCULAR; INTRAVENOUS; SUBCUTANEOUS at 10:20

## 2021-06-05 RX ADMIN — Medication 180 MILLIGRAM(S): at 09:39

## 2021-06-05 RX ADMIN — Medication 2: at 11:50

## 2021-06-05 RX ADMIN — HYDROMORPHONE HYDROCHLORIDE 4 MILLIGRAM(S): 2 INJECTION INTRAMUSCULAR; INTRAVENOUS; SUBCUTANEOUS at 02:30

## 2021-06-05 RX ADMIN — ISOSORBIDE MONONITRATE 60 MILLIGRAM(S): 60 TABLET, EXTENDED RELEASE ORAL at 12:27

## 2021-06-05 RX ADMIN — HYDROMORPHONE HYDROCHLORIDE 4 MILLIGRAM(S): 2 INJECTION INTRAMUSCULAR; INTRAVENOUS; SUBCUTANEOUS at 09:26

## 2021-06-05 RX ADMIN — BUPROPION HYDROCHLORIDE 150 MILLIGRAM(S): 150 TABLET, EXTENDED RELEASE ORAL at 12:27

## 2021-06-05 RX ADMIN — CYCLOBENZAPRINE HYDROCHLORIDE 10 MILLIGRAM(S): 10 TABLET, FILM COATED ORAL at 13:24

## 2021-06-05 NOTE — PROGRESS NOTE ADULT - SUBJECTIVE AND OBJECTIVE BOX
ORTHO-SPINE PROGRESS NOTE:    Pt Name: KATHRYN PARRA    MRN: [id number]      Patient seen and examined at bedside. Pt states is doing well at this time with some mild groin discomfort. Pt pain being managed well with pain management.   Pt denies any CP, SOB, Headache, numbness, tingling, abdominal pain.      PAST MEDICAL & SURGICAL HISTORY:  PAST MEDICAL & SURGICAL HISTORY:  HTN (hypertension)    Coronary vasospasm    MI (myocardial infarction)    Diabetes    High cholesterol    Asthma    Spondylolisthesis, lumbosacral region    GERD (gastroesophageal reflux disease)    Former smoker    H/O spinal fusion  cervical    S/P arthroscopic surgery of left knee    S/P arthroscopy of right shoulder    S/P  section  ,     History of cholecystectomy      S/P arthroscopic surgery of right knee        Allergies: Ceclor (Hives)  Cipro (Rash)  Lorabid (Hives)  seasonal allergies, cats (Rhinitis)      Medications: acetaminophen   Tablet .. 650 milliGRAM(s) Oral every 6 hours PRN  ALPRAZolam 0.5 milliGRAM(s) Oral three times a day PRN  atorvastatin 40 milliGRAM(s) Oral at bedtime  buPROPion XL (24-Hour) . 150 milliGRAM(s) Oral daily  clindamycin IVPB 900 milliGRAM(s) IV Intermittent every 8 hours  cyclobenzaprine 10 milliGRAM(s) Oral every 8 hours  dextrose 40% Gel 15 Gram(s) Oral once  dextrose 40% Gel 15 Gram(s) Oral once  dextrose 5%. 1000 milliLiter(s) IV Continuous <Continuous>  dextrose 5%. 1000 milliLiter(s) IV Continuous <Continuous>  dextrose 50% Injectable 25 Gram(s) IV Push once  dextrose 50% Injectable 12.5 Gram(s) IV Push once  dextrose 50% Injectable 25 Gram(s) IV Push once  dextrose 50% Injectable 12.5 Gram(s) IV Push once  dextrose 50% Injectable 25 Gram(s) IV Push once  diltiazem    milliGRAM(s) Oral <User Schedule>  furosemide    Tablet 20 milliGRAM(s) Oral daily  glucagon  Injectable 1 milliGRAM(s) IntraMuscular once  glucagon  Injectable 1 milliGRAM(s) IntraMuscular once  HYDROmorphone   Tablet 4 milliGRAM(s) Oral every 3 hours PRN  HYDROmorphone  Injectable 0.5 milliGRAM(s) IV Push every 3 hours PRN  insulin lispro (ADMELOG) corrective regimen sliding scale   SubCutaneous three times a day before meals  isosorbide   mononitrate ER Tablet (IMDUR) 60 milliGRAM(s) Oral daily  lisinopril 5 milliGRAM(s) Oral daily  montelukast 10 milliGRAM(s) Oral at bedtime  naloxone Injectable 0.1 milliGRAM(s) IV Push every 3 minutes PRN  ondansetron   Disintegrating Tablet 4 milliGRAM(s) Oral every 6 hours PRN  ondansetron Injectable 4 milliGRAM(s) IV Push every 6 hours PRN  oxybutynin 5 milliGRAM(s) Oral at bedtime  oxyCODONE    IR 5 milliGRAM(s) Oral every 3 hours PRN  oxyCODONE    IR 10 milliGRAM(s) Oral every 3 hours PRN  pantoprazole    Tablet 40 milliGRAM(s) Oral before breakfast  ranolazine 1000 milliGRAM(s) Oral two times a day  senna 2 Tablet(s) Oral at bedtime  traZODone 100 milliGRAM(s) Oral at bedtime                                10.5   8.83  )-----------( 197      ( 2021 06:01 )             31.0     06-05    139  |  102  |  10.2  ----------------------------<  147<H>  3.5   |  28.0  |  0.61    Ca    8.2<L>      2021 06:01        PHYSICAL EXAM:    Vital Signs Last 24 Hrs  T(C): 37.2 (2021 04:26), Max: 37.2 (2021 15:31)  T(F): 98.9 (2021 04:26), Max: 99 (2021 15:31)  HR: 76 (2021 04:26) (70 - 76)  BP: 99/54 (2021 04:26) (92/57 - 122/70)  BP(mean): --  RR: 18 (2021 04:26) (16 - 18)  SpO2: 92% (2021 04:26) (92% - 98%)  Daily     Daily     Appearance: Alert, responsive, in no acute distress.    Neurological: Sensation is grossly intact to light touch. 5/5 motor function of all extremities. No focal deficits or weaknesses found.    Skin: no rash on visible skin. Skin is clean, dry and intact. No bleeding. No abrasions. No ulcerations.    Vascular: 2+ distal pulses. Cap refill < 2 sec. No signs of venous   insufficiency   or stasis. No extremity ulcerations. No cyanosis.    Musculoskeletal:         Left Upper Extremity:       Right Upper Extremity:       Left Lower Extremity:       Right Lower Extremity:      A/P:  Pt is a  46y Female  S/P TLIF L5-S1    PLAN:   * Cont pain management prn  * Cont Hemovac until <30cc /shift  * Cont B/L LE SCD's   * Cont PT out of bed to chair (WBAT)  * Incentive spirometer encouraged  * Cont ABX while drain in place  * Dispo planning -home ORTHO-SPINE PROGRESS NOTE:    Pt Name: KATHRYN PARRA    MRN: [id number]      Patient seen and examined at bedside. Pt states is doing well at this time with some mild groin discomfort. Pt pain being managed well with pain management.   Pt denies any CP, SOB, Headache, numbness, tingling, abdominal pain.      PAST MEDICAL & SURGICAL HISTORY:  PAST MEDICAL & SURGICAL HISTORY:  HTN (hypertension)    Coronary vasospasm    MI (myocardial infarction)    Diabetes    High cholesterol    Asthma    Spondylolisthesis, lumbosacral region    GERD (gastroesophageal reflux disease)    Former smoker    H/O spinal fusion  cervical    S/P arthroscopic surgery of left knee    S/P arthroscopy of right shoulder    S/P  section  ,     History of cholecystectomy      S/P arthroscopic surgery of right knee        Allergies: Ceclor (Hives)  Cipro (Rash)  Lorabid (Hives)  seasonal allergies, cats (Rhinitis)      Medications: acetaminophen   Tablet .. 650 milliGRAM(s) Oral every 6 hours PRN  ALPRAZolam 0.5 milliGRAM(s) Oral three times a day PRN  atorvastatin 40 milliGRAM(s) Oral at bedtime  buPROPion XL (24-Hour) . 150 milliGRAM(s) Oral daily  clindamycin IVPB 900 milliGRAM(s) IV Intermittent every 8 hours  cyclobenzaprine 10 milliGRAM(s) Oral every 8 hours  dextrose 40% Gel 15 Gram(s) Oral once  dextrose 40% Gel 15 Gram(s) Oral once  dextrose 5%. 1000 milliLiter(s) IV Continuous <Continuous>  dextrose 5%. 1000 milliLiter(s) IV Continuous <Continuous>  dextrose 50% Injectable 25 Gram(s) IV Push once  dextrose 50% Injectable 12.5 Gram(s) IV Push once  dextrose 50% Injectable 25 Gram(s) IV Push once  dextrose 50% Injectable 12.5 Gram(s) IV Push once  dextrose 50% Injectable 25 Gram(s) IV Push once  diltiazem    milliGRAM(s) Oral <User Schedule>  furosemide    Tablet 20 milliGRAM(s) Oral daily  glucagon  Injectable 1 milliGRAM(s) IntraMuscular once  glucagon  Injectable 1 milliGRAM(s) IntraMuscular once  HYDROmorphone   Tablet 4 milliGRAM(s) Oral every 3 hours PRN  HYDROmorphone  Injectable 0.5 milliGRAM(s) IV Push every 3 hours PRN  insulin lispro (ADMELOG) corrective regimen sliding scale   SubCutaneous three times a day before meals  isosorbide   mononitrate ER Tablet (IMDUR) 60 milliGRAM(s) Oral daily  lisinopril 5 milliGRAM(s) Oral daily  montelukast 10 milliGRAM(s) Oral at bedtime  naloxone Injectable 0.1 milliGRAM(s) IV Push every 3 minutes PRN  ondansetron   Disintegrating Tablet 4 milliGRAM(s) Oral every 6 hours PRN  ondansetron Injectable 4 milliGRAM(s) IV Push every 6 hours PRN  oxybutynin 5 milliGRAM(s) Oral at bedtime  oxyCODONE    IR 5 milliGRAM(s) Oral every 3 hours PRN  oxyCODONE    IR 10 milliGRAM(s) Oral every 3 hours PRN  pantoprazole    Tablet 40 milliGRAM(s) Oral before breakfast  ranolazine 1000 milliGRAM(s) Oral two times a day  senna 2 Tablet(s) Oral at bedtime  traZODone 100 milliGRAM(s) Oral at bedtime                                10.5   8.83  )-----------( 197      ( 2021 06:01 )             31.0     06-05    139  |  102  |  10.2  ----------------------------<  147<H>  3.5   |  28.0  |  0.61    Ca    8.2<L>      2021 06:01        PHYSICAL EXAM:    Vital Signs Last 24 Hrs  T(C): 37.2 (2021 04:26), Max: 37.2 (2021 15:31)  T(F): 98.9 (2021 04:26), Max: 99 (2021 15:31)  HR: 76 (2021 04:26) (70 - 76)  BP: 99/54 (2021 04:26) (92/57 - 122/70)  BP(mean): --  RR: 18 (2021 04:26) (16 - 18)  SpO2: 92% (2021 04:26) (92% - 98%)  Daily     Daily     Appearance: Alert, responsive, in no acute distress.    Neurological: Sensation is grossly intact to light touch. No focal deficits or weaknesses found.    Skin: no rash on visible skin. Skin is clean, dry and intact. No bleeding. No abrasions. No ulcerations.    Vascular: 2+ distal pulses. Cap refill < 2 sec. No signs of venous   insufficiency   or stasis. No extremity ulcerations. No cyanosis.    Dressing clean, dry and intact, no drainage noted, no erythema or ecchymosis noted     Hemovac intact, cannister empty at this time. 100 cc over 24 hrs and 50 cc over 12hrs    Lower extremity strength 5/5           A/P:  Pt is a  46y Female  S/P TLIF L5-S1    PLAN:   * Cont pain management prn  * Cont Hemovac until <30cc /shift  * Cont B/L LE SCD's   * Cont PT out of bed to chair (WBAT)  * Incentive spirometer encouraged  * Cont ABX while drain in place  * Dispo planning -home

## 2021-06-05 NOTE — PROGRESS NOTE ADULT - ASSESSMENT
47 yo F with PMH of HTN, HLD, DM2, non-obstructive CAD, coronary artery vasospasm, GERD, depression, former smoker, presents with c/o severe back pain for 1 year after a fall, s/p injections with limited relief, controlled with Percocet prn, now s/p laminectomy L5-S1 and posterior lumbar interbody fusion, posterior spine fusion POD#2.     Problem/Recommendation - 1:  Problem: Spondylolisthesis, lumbosacral region. Recommendation: S/p laminectomy L5-S1 and posterior lumbar interbody fusion, posterior spine fusion POD#2.  Pain control - currently on PCA.  PT.  Antibiotics, wound care, hemovac and DVT prophylaxis as per Ortho.  Incentive spirometry.  Avoid opioid induced constipation.     Problem/Recommendation - 2:  ·  Problem: Coronary vasospasm.  Recommendation: With non-obstructive CAD  Continue Imdur and Ranexa  Recent stress test 5/7/21 normal  Last cath 4/2019 - 30% blockage of LAD.  Continue ASA and Plavix when okay with Ortho; Continue Lipitor.  Follows with Dr. Mock outpatient.      Problem/Recommendation - 3:  ·  Problem: Essential hypertension.  Recommendation: Continue home medications with parameters.      Problem/Recommendation - 4:  ·  Problem: High cholesterol.  Recommendation: Continue statin.      Problem/Recommendation - 5:  ·  Problem: Type 2 diabetes mellitus without complication, without long-term current use of insulin.  Recommendation: FS qAC and qHS with sliding scale coverage.  A1c 6.4.  Hold Januvia while inpatient, resume on discharge.  ADA diet.      Problem/Recommendation - 6:  Problem: Gastroesophageal reflux disease without esophagitis. Recommendation: Continue PPI.     Problem/Recommendation - 7:  Problem: Depression, unspecified depression type. Recommendation: Continue home medications.    hypoxemia: could be some atelactesis, there is no tachycardia, lung exam no added sounds, will encourage incentive spiromtery, will give duo nebs fir now as she has hx of asthma, will get cxr, if require more oxygen will r/o PE, will monotor closely, will get EKG>       Problem/Recommendation - 8:  Problem: Need for prophylactic measure. Recommendation: Ambulate as per Ortho.

## 2021-06-05 NOTE — PROGRESS NOTE ADULT - SUBJECTIVE AND OBJECTIVE BOX
KATHRYN PARRA    [id number]    46y      Female    Patient is a 46y old  Female who presents with a chief complaint of spine surgery (05 Jun 2021 08:04)      INTERVAL HPI/OVERNIGHT EVENTS:  Patient pain is well controlled with pa in meds, denies fever, chills, chest pain    REVIEW OF SYSTEMS:    CONSTITUTIONAL: No fever, some fatigue  RESPIRATORY: No cough, No shortness of breath  CARDIOVASCULAR: No chest pain, palpitations  GASTROINTESTINAL: No abdominal, No nausea, vomiting  NEUROLOGICAL: No headaches,  loss of strength.  MISCELLANEOUS: No joint swelling or pain       Vital Signs Last 24 Hrs  T(C): 37.2 (05 Jun 2021 16:34), Max: 37.2 (04 Jun 2021 20:35)  T(F): 99 (05 Jun 2021 16:34), Max: 99 (04 Jun 2021 20:35)  HR: 79 (05 Jun 2021 16:34) (75 - 79)  BP: 106/65 (05 Jun 2021 16:34) (92/57 - 122/85)  BP(mean): --  RR: 18 (05 Jun 2021 16:34) (18 - 18)  SpO2: 96% (05 Jun 2021 16:34) (92% - 96%)    PHYSICAL EXAM:    GENERAL: Elderly male looking   HEENT: PERRL, +EOMI  NECK: soft, Supple, No JVD,   CHEST/LUNG: Clear to auscultate bilaterally; No wheezing  HEART: S1S2+, Regular rate and rhythm; No murmurs  ABDOMEN: Soft, Nontender, Nondistended; Bowel sounds present  EXTREMITIES:  2+ Peripheral Pulses, No edema  SKIN: No rashes or lesions  NEURO: AAOX3, no focal deficits, no motor r sensory loss  PSYCH: normal mood      LABS:                        10.5   8.83  )-----------( 197      ( 05 Jun 2021 06:01 )             31.0     06-05    139  |  102  |  10.2  ----------------------------<  147<H>  3.5   |  28.0  |  0.61    Ca    8.2<L>      05 Jun 2021 06:01              I&O's Summary    04 Jun 2021 07:01  -  05 Jun 2021 07:00  --------------------------------------------------------  IN: 1280 mL / OUT: 5200 mL / NET: -3920 mL        MEDICATIONS  (STANDING):  albuterol/ipratropium for Nebulization 3 milliLiter(s) Nebulizer every 6 hours  atorvastatin 40 milliGRAM(s) Oral at bedtime  buPROPion XL (24-Hour) . 150 milliGRAM(s) Oral daily  clindamycin IVPB 900 milliGRAM(s) IV Intermittent every 8 hours  cyclobenzaprine 10 milliGRAM(s) Oral every 8 hours  dextrose 40% Gel 15 Gram(s) Oral once  dextrose 40% Gel 15 Gram(s) Oral once  dextrose 5%. 1000 milliLiter(s) (50 mL/Hr) IV Continuous <Continuous>  dextrose 5%. 1000 milliLiter(s) (100 mL/Hr) IV Continuous <Continuous>  dextrose 50% Injectable 25 Gram(s) IV Push once  dextrose 50% Injectable 12.5 Gram(s) IV Push once  dextrose 50% Injectable 25 Gram(s) IV Push once  dextrose 50% Injectable 12.5 Gram(s) IV Push once  dextrose 50% Injectable 25 Gram(s) IV Push once  diltiazem    milliGRAM(s) Oral <User Schedule>  furosemide    Tablet 20 milliGRAM(s) Oral daily  glucagon  Injectable 1 milliGRAM(s) IntraMuscular once  glucagon  Injectable 1 milliGRAM(s) IntraMuscular once  insulin lispro (ADMELOG) corrective regimen sliding scale   SubCutaneous three times a day before meals  isosorbide   mononitrate ER Tablet (IMDUR) 60 milliGRAM(s) Oral daily  lisinopril 5 milliGRAM(s) Oral daily  montelukast 10 milliGRAM(s) Oral at bedtime  oxybutynin 5 milliGRAM(s) Oral at bedtime  pantoprazole    Tablet 40 milliGRAM(s) Oral before breakfast  ranolazine 1000 milliGRAM(s) Oral two times a day  senna 2 Tablet(s) Oral at bedtime  traZODone 100 milliGRAM(s) Oral at bedtime    MEDICATIONS  (PRN):  acetaminophen   Tablet .. 650 milliGRAM(s) Oral every 6 hours PRN Temp greater or equal to 38.5C (101.3F)  ALPRAZolam 0.5 milliGRAM(s) Oral three times a day PRN anxiety  HYDROmorphone   Tablet 4 milliGRAM(s) Oral every 3 hours PRN Severe Pain (7 - 10)  HYDROmorphone  Injectable 0.5 milliGRAM(s) IV Push every 3 hours PRN Breakthrough  naloxone Injectable 0.1 milliGRAM(s) IV Push every 3 minutes PRN For ANY of the following changes in patient status:  A. RR LESS THAN 10 breaths per minute, B. Oxygen saturation LESS THAN 90%, C. Sedation score of 6  ondansetron   Disintegrating Tablet 4 milliGRAM(s) Oral every 6 hours PRN Nausea  ondansetron Injectable 4 milliGRAM(s) IV Push every 6 hours PRN Nausea  oxyCODONE    IR 5 milliGRAM(s) Oral every 3 hours PRN Mild Pain (1 - 3)  oxyCODONE    IR 10 milliGRAM(s) Oral every 3 hours PRN Moderate Pain (4 - 6)         KATHRYN PARRA    [id number]    46y      Female    Patient is a 46y old  Female who presents with a chief complaint of spine surgery (05 Jun 2021 08:04)      INTERVAL HPI/OVERNIGHT EVENTS:  Patient pain is well controlled with pain meds, denies fever, chills, chest pain, as per nursing staff patient has some hypoxemia which improved with 2 liters with nasal cannula, as per patient she has no sob, wheezing    REVIEW OF SYSTEMS:    CONSTITUTIONAL: No fever, some fatigue  RESPIRATORY: No cough, No shortness of breath  CARDIOVASCULAR: No chest pain, palpitations  GASTROINTESTINAL: No abdominal, No nausea, vomiting  NEUROLOGICAL: No headaches,  loss of strength.  MISCELLANEOUS: back pain controlled with pin meds      Vital Signs Last 24 Hrs  T(C): 37.2 (05 Jun 2021 16:34), Max: 37.2 (04 Jun 2021 20:35)  T(F): 99 (05 Jun 2021 16:34), Max: 99 (04 Jun 2021 20:35)  HR: 79 (05 Jun 2021 16:34) (75 - 79)  BP: 106/65 (05 Jun 2021 16:34) (92/57 - 122/85)  RR: 18 (05 Jun 2021 16:34) (18 - 18)  SpO2: 96% (05 Jun 2021 16:34) (92% - 96%)    PHYSICAL EXAM:    GENERAL: Middle age female looking comfortable    HEENT: PERRL, +EOMI  NECK: soft, Supple, No JVD,   CHEST/LUNG: Clear to auscultate bilaterally; No wheezing  HEART: S1S2+, Regular rate and rhythm; No murmurs  ABDOMEN: Soft, Nontender, Nondistended; Bowel sounds present  EXTREMITIES:  1+ Peripheral Pulses, No edema,   SKIN: No rashes or lesions  NEURO: AAOX3, no focal deficits, no motor r sensory loss  PSYCH: normal mood  Back: has clean dressing on, no bleeding or soaking       LABS:                        10.5   8.83  )-----------( 197      ( 05 Jun 2021 06:01 )             31.0     06-05    139  |  102  |  10.2  ----------------------------<  147<H>  3.5   |  28.0  |  0.61    Ca    8.2<L>      05 Jun 2021 06:01              I&O's Summary    04 Jun 2021 07:01  -  05 Jun 2021 07:00  --------------------------------------------------------  IN: 1280 mL / OUT: 5200 mL / NET: -3920 mL        MEDICATIONS  (STANDING):  albuterol/ipratropium for Nebulization 3 milliLiter(s) Nebulizer every 6 hours  atorvastatin 40 milliGRAM(s) Oral at bedtime  buPROPion XL (24-Hour) . 150 milliGRAM(s) Oral daily  clindamycin IVPB 900 milliGRAM(s) IV Intermittent every 8 hours  cyclobenzaprine 10 milliGRAM(s) Oral every 8 hours  dextrose 40% Gel 15 Gram(s) Oral once  dextrose 40% Gel 15 Gram(s) Oral once  dextrose 5%. 1000 milliLiter(s) (50 mL/Hr) IV Continuous <Continuous>  dextrose 5%. 1000 milliLiter(s) (100 mL/Hr) IV Continuous <Continuous>  dextrose 50% Injectable 25 Gram(s) IV Push once  dextrose 50% Injectable 12.5 Gram(s) IV Push once  dextrose 50% Injectable 25 Gram(s) IV Push once  dextrose 50% Injectable 12.5 Gram(s) IV Push once  dextrose 50% Injectable 25 Gram(s) IV Push once  diltiazem    milliGRAM(s) Oral <User Schedule>  furosemide    Tablet 20 milliGRAM(s) Oral daily  glucagon  Injectable 1 milliGRAM(s) IntraMuscular once  glucagon  Injectable 1 milliGRAM(s) IntraMuscular once  insulin lispro (ADMELOG) corrective regimen sliding scale   SubCutaneous three times a day before meals  isosorbide   mononitrate ER Tablet (IMDUR) 60 milliGRAM(s) Oral daily  lisinopril 5 milliGRAM(s) Oral daily  montelukast 10 milliGRAM(s) Oral at bedtime  oxybutynin 5 milliGRAM(s) Oral at bedtime  pantoprazole    Tablet 40 milliGRAM(s) Oral before breakfast  ranolazine 1000 milliGRAM(s) Oral two times a day  senna 2 Tablet(s) Oral at bedtime  traZODone 100 milliGRAM(s) Oral at bedtime    MEDICATIONS  (PRN):  acetaminophen   Tablet .. 650 milliGRAM(s) Oral every 6 hours PRN Temp greater or equal to 38.5C (101.3F)  ALPRAZolam 0.5 milliGRAM(s) Oral three times a day PRN anxiety  HYDROmorphone   Tablet 4 milliGRAM(s) Oral every 3 hours PRN Severe Pain (7 - 10)  HYDROmorphone  Injectable 0.5 milliGRAM(s) IV Push every 3 hours PRN Breakthrough  naloxone Injectable 0.1 milliGRAM(s) IV Push every 3 minutes PRN For ANY of the following changes in patient status:  A. RR LESS THAN 10 breaths per minute, B. Oxygen saturation LESS THAN 90%, C. Sedation score of 6  ondansetron   Disintegrating Tablet 4 milliGRAM(s) Oral every 6 hours PRN Nausea  ondansetron Injectable 4 milliGRAM(s) IV Push every 6 hours PRN Nausea  oxyCODONE    IR 5 milliGRAM(s) Oral every 3 hours PRN Mild Pain (1 - 3)  oxyCODONE    IR 10 milliGRAM(s) Oral every 3 hours PRN Moderate Pain (4 - 6)

## 2021-06-05 NOTE — PROGRESS NOTE ADULT - SUBJECTIVE AND OBJECTIVE BOX
Patient seen today 65/21 POD#2 s/p Tlis L5S1. Reports some right groin pain, incisional back pain. Resolution of radicular pain. AVSS. Dressing intact, drain functional. Moves bilateral lower extremeties to command with good strength.   Plan: Continue hemovac until <30cc/shift. Continue scd and incentive spirometer. OOB to chair, ambulate wbat. Likely d/c to home Sun/Mon.

## 2021-06-05 NOTE — CHART NOTE - NSCHARTNOTEFT_GEN_A_CORE
NP Medicine Note    Patient's O2 sat 86-88% when sleeping and >94% when awake. Patient in no acute distress and denies sob. Placed on 2L NC.

## 2021-06-06 ENCOUNTER — TRANSCRIPTION ENCOUNTER (OUTPATIENT)
Age: 46
End: 2021-06-06

## 2021-06-06 VITALS
RESPIRATION RATE: 18 BRPM | HEART RATE: 89 BPM | DIASTOLIC BLOOD PRESSURE: 79 MMHG | TEMPERATURE: 99 F | SYSTOLIC BLOOD PRESSURE: 131 MMHG | OXYGEN SATURATION: 95 %

## 2021-06-06 LAB
ANION GAP SERPL CALC-SCNC: 7 MMOL/L — SIGNIFICANT CHANGE UP (ref 5–17)
BASOPHILS # BLD AUTO: 0.03 K/UL — SIGNIFICANT CHANGE UP (ref 0–0.2)
BASOPHILS NFR BLD AUTO: 0.4 % — SIGNIFICANT CHANGE UP (ref 0–2)
BUN SERPL-MCNC: 7.8 MG/DL — LOW (ref 8–20)
CALCIUM SERPL-MCNC: 8.7 MG/DL — SIGNIFICANT CHANGE UP (ref 8.6–10.2)
CHLORIDE SERPL-SCNC: 101 MMOL/L — SIGNIFICANT CHANGE UP (ref 98–107)
CO2 SERPL-SCNC: 31 MMOL/L — HIGH (ref 22–29)
CREAT SERPL-MCNC: 0.65 MG/DL — SIGNIFICANT CHANGE UP (ref 0.5–1.3)
EOSINOPHIL # BLD AUTO: 0.24 K/UL — SIGNIFICANT CHANGE UP (ref 0–0.5)
EOSINOPHIL NFR BLD AUTO: 3.1 % — SIGNIFICANT CHANGE UP (ref 0–6)
GLUCOSE BLDC GLUCOMTR-MCNC: 139 MG/DL — HIGH (ref 70–99)
GLUCOSE BLDC GLUCOMTR-MCNC: 158 MG/DL — HIGH (ref 70–99)
GLUCOSE SERPL-MCNC: 137 MG/DL — HIGH (ref 70–99)
HCT VFR BLD CALC: 32.8 % — LOW (ref 34.5–45)
HGB BLD-MCNC: 11.3 G/DL — LOW (ref 11.5–15.5)
IMM GRANULOCYTES NFR BLD AUTO: 1 % — SIGNIFICANT CHANGE UP (ref 0–1.5)
LYMPHOCYTES # BLD AUTO: 1.62 K/UL — SIGNIFICANT CHANGE UP (ref 1–3.3)
LYMPHOCYTES # BLD AUTO: 20.8 % — SIGNIFICANT CHANGE UP (ref 13–44)
MCHC RBC-ENTMCNC: 30.5 PG — SIGNIFICANT CHANGE UP (ref 27–34)
MCHC RBC-ENTMCNC: 34.5 GM/DL — SIGNIFICANT CHANGE UP (ref 32–36)
MCV RBC AUTO: 88.6 FL — SIGNIFICANT CHANGE UP (ref 80–100)
MONOCYTES # BLD AUTO: 0.86 K/UL — SIGNIFICANT CHANGE UP (ref 0–0.9)
MONOCYTES NFR BLD AUTO: 11 % — SIGNIFICANT CHANGE UP (ref 2–14)
NEUTROPHILS # BLD AUTO: 4.96 K/UL — SIGNIFICANT CHANGE UP (ref 1.8–7.4)
NEUTROPHILS NFR BLD AUTO: 63.7 % — SIGNIFICANT CHANGE UP (ref 43–77)
PLATELET # BLD AUTO: 207 K/UL — SIGNIFICANT CHANGE UP (ref 150–400)
POTASSIUM SERPL-MCNC: 4 MMOL/L — SIGNIFICANT CHANGE UP (ref 3.5–5.3)
POTASSIUM SERPL-SCNC: 4 MMOL/L — SIGNIFICANT CHANGE UP (ref 3.5–5.3)
RBC # BLD: 3.7 M/UL — LOW (ref 3.8–5.2)
RBC # FLD: 12 % — SIGNIFICANT CHANGE UP (ref 10.3–14.5)
SODIUM SERPL-SCNC: 139 MMOL/L — SIGNIFICANT CHANGE UP (ref 135–145)
WBC # BLD: 7.79 K/UL — SIGNIFICANT CHANGE UP (ref 3.8–10.5)
WBC # FLD AUTO: 7.79 K/UL — SIGNIFICANT CHANGE UP (ref 3.8–10.5)

## 2021-06-06 PROCEDURE — 82962 GLUCOSE BLOOD TEST: CPT

## 2021-06-06 PROCEDURE — 80048 BASIC METABOLIC PNL TOTAL CA: CPT

## 2021-06-06 PROCEDURE — 76000 FLUOROSCOPY <1 HR PHYS/QHP: CPT

## 2021-06-06 PROCEDURE — 97110 THERAPEUTIC EXERCISES: CPT

## 2021-06-06 PROCEDURE — C1889: CPT

## 2021-06-06 PROCEDURE — 97163 PT EVAL HIGH COMPLEX 45 MIN: CPT

## 2021-06-06 PROCEDURE — 85025 COMPLETE CBC W/AUTO DIFF WBC: CPT

## 2021-06-06 PROCEDURE — 86769 SARS-COV-2 COVID-19 ANTIBODY: CPT

## 2021-06-06 PROCEDURE — 93005 ELECTROCARDIOGRAM TRACING: CPT

## 2021-06-06 PROCEDURE — 97116 GAIT TRAINING THERAPY: CPT

## 2021-06-06 PROCEDURE — 71045 X-RAY EXAM CHEST 1 VIEW: CPT

## 2021-06-06 PROCEDURE — 36415 COLL VENOUS BLD VENIPUNCTURE: CPT

## 2021-06-06 PROCEDURE — 97530 THERAPEUTIC ACTIVITIES: CPT

## 2021-06-06 PROCEDURE — 99232 SBSQ HOSP IP/OBS MODERATE 35: CPT

## 2021-06-06 PROCEDURE — C1713: CPT

## 2021-06-06 RX ORDER — SOLIFENACIN SUCCINATE 10 MG/1
1 TABLET ORAL
Qty: 0 | Refills: 0 | DISCHARGE

## 2021-06-06 RX ORDER — RAMIPRIL 5 MG
0 CAPSULE ORAL
Qty: 0 | Refills: 0 | DISCHARGE

## 2021-06-06 RX ORDER — SITAGLIPTIN 50 MG/1
1 TABLET, FILM COATED ORAL
Qty: 0 | Refills: 0 | DISCHARGE

## 2021-06-06 RX ORDER — TRAZODONE HCL 50 MG
2 TABLET ORAL
Qty: 0 | Refills: 0 | DISCHARGE

## 2021-06-06 RX ORDER — RANOLAZINE 500 MG/1
1 TABLET, FILM COATED, EXTENDED RELEASE ORAL
Qty: 0 | Refills: 0 | DISCHARGE

## 2021-06-06 RX ORDER — CLOPIDOGREL BISULFATE 75 MG/1
1 TABLET, FILM COATED ORAL
Qty: 0 | Refills: 0 | DISCHARGE

## 2021-06-06 RX ORDER — ACETAMINOPHEN 500 MG
2 TABLET ORAL
Qty: 0 | Refills: 0 | DISCHARGE
Start: 2021-06-06

## 2021-06-06 RX ORDER — NITROGLYCERIN 6.5 MG
0 CAPSULE, EXTENDED RELEASE ORAL
Qty: 0 | Refills: 0 | DISCHARGE

## 2021-06-06 RX ORDER — ALPRAZOLAM 0.25 MG
1 TABLET ORAL
Qty: 0 | Refills: 0 | DISCHARGE

## 2021-06-06 RX ORDER — DILTIAZEM HCL 120 MG
0 CAPSULE, EXT RELEASE 24 HR ORAL
Qty: 0 | Refills: 0 | DISCHARGE

## 2021-06-06 RX ORDER — ATORVASTATIN CALCIUM 80 MG/1
1 TABLET, FILM COATED ORAL
Qty: 0 | Refills: 0 | DISCHARGE

## 2021-06-06 RX ORDER — CYCLOBENZAPRINE HYDROCHLORIDE 10 MG/1
1 TABLET, FILM COATED ORAL
Qty: 9 | Refills: 0
Start: 2021-06-06 | End: 2021-06-08

## 2021-06-06 RX ORDER — PANTOPRAZOLE SODIUM 20 MG/1
1 TABLET, DELAYED RELEASE ORAL
Qty: 0 | Refills: 0 | DISCHARGE

## 2021-06-06 RX ORDER — FUROSEMIDE 40 MG
1 TABLET ORAL
Qty: 0 | Refills: 0 | DISCHARGE

## 2021-06-06 RX ORDER — ISOSORBIDE MONONITRATE 60 MG/1
1 TABLET, EXTENDED RELEASE ORAL
Qty: 0 | Refills: 0 | DISCHARGE

## 2021-06-06 RX ORDER — DESVENLAFAXINE 50 MG/1
100 TABLET, EXTENDED RELEASE ORAL
Qty: 0 | Refills: 0 | DISCHARGE

## 2021-06-06 RX ORDER — ASPIRIN/CALCIUM CARB/MAGNESIUM 324 MG
0 TABLET ORAL
Qty: 0 | Refills: 0 | DISCHARGE

## 2021-06-06 RX ORDER — BUPROPION HYDROCHLORIDE 150 MG/1
150 TABLET, EXTENDED RELEASE ORAL
Qty: 0 | Refills: 0 | DISCHARGE

## 2021-06-06 RX ORDER — OXYCODONE HYDROCHLORIDE 5 MG/1
1 TABLET ORAL
Qty: 42 | Refills: 0
Start: 2021-06-06 | End: 2021-06-12

## 2021-06-06 RX ADMIN — Medication 1: at 12:29

## 2021-06-06 RX ADMIN — OXYCODONE HYDROCHLORIDE 5 MILLIGRAM(S): 5 TABLET ORAL at 08:34

## 2021-06-06 RX ADMIN — CYCLOBENZAPRINE HYDROCHLORIDE 10 MILLIGRAM(S): 10 TABLET, FILM COATED ORAL at 15:58

## 2021-06-06 RX ADMIN — Medication 100 MILLIGRAM(S): at 01:16

## 2021-06-06 RX ADMIN — OXYCODONE HYDROCHLORIDE 5 MILLIGRAM(S): 5 TABLET ORAL at 10:30

## 2021-06-06 RX ADMIN — Medication 100 MILLIGRAM(S): at 12:27

## 2021-06-06 RX ADMIN — HYDROMORPHONE HYDROCHLORIDE 4 MILLIGRAM(S): 2 INJECTION INTRAMUSCULAR; INTRAVENOUS; SUBCUTANEOUS at 02:19

## 2021-06-06 RX ADMIN — CYCLOBENZAPRINE HYDROCHLORIDE 10 MILLIGRAM(S): 10 TABLET, FILM COATED ORAL at 05:44

## 2021-06-06 RX ADMIN — OXYCODONE HYDROCHLORIDE 10 MILLIGRAM(S): 5 TABLET ORAL at 12:30

## 2021-06-06 RX ADMIN — PANTOPRAZOLE SODIUM 40 MILLIGRAM(S): 20 TABLET, DELAYED RELEASE ORAL at 05:44

## 2021-06-06 RX ADMIN — Medication 20 MILLIGRAM(S): at 12:28

## 2021-06-06 RX ADMIN — OXYCODONE HYDROCHLORIDE 10 MILLIGRAM(S): 5 TABLET ORAL at 12:27

## 2021-06-06 RX ADMIN — HYDROMORPHONE HYDROCHLORIDE 4 MILLIGRAM(S): 2 INJECTION INTRAMUSCULAR; INTRAVENOUS; SUBCUTANEOUS at 01:19

## 2021-06-06 RX ADMIN — RANOLAZINE 1000 MILLIGRAM(S): 500 TABLET, FILM COATED, EXTENDED RELEASE ORAL at 05:44

## 2021-06-06 RX ADMIN — BUPROPION HYDROCHLORIDE 150 MILLIGRAM(S): 150 TABLET, EXTENDED RELEASE ORAL at 12:28

## 2021-06-06 NOTE — PROGRESS NOTE ADULT - SUBJECTIVE AND OBJECTIVE BOX
KATHRYN PARRA    [id number]    46y      Female    Patient is a 46y old  Female who presents with a chief complaint of Postoperative care - Lumbar fusion (06 Jun 2021 07:28)      INTERVAL HPI/OVERNIGHT EVENTS:    Patient pain is well controlled with pain meds, she has no weakness or numbness, denies fever, chills, chest pain, she has been weaned off from the supplemental oxygen, doing ok, she has no chest pain, palpitation, nausea, vomiting.     REVIEW OF SYSTEMS:    CONSTITUTIONAL: No fever, some fatigue  RESPIRATORY: No cough, No shortness of breath  CARDIOVASCULAR: No chest pain, palpitations  GASTROINTESTINAL: No abdominal, No nausea, vomiting  NEUROLOGICAL: No headaches,  loss of strength.  MISCELLANEOUS: back pain controlled with pin meds      Vital Signs Last 24 Hrs  T(C): 37.7 (06 Jun 2021 10:55), Max: 37.7 (06 Jun 2021 10:55)  T(F): 99.9 (06 Jun 2021 10:55), Max: 99.9 (06 Jun 2021 10:55)  HR: 79 (06 Jun 2021 10:55) (73 - 79)  BP: 109/71 (06 Jun 2021 10:55) (97/59 - 109/71)  RR: 18 (06 Jun 2021 10:55) (18 - 18)  SpO2: 97% (06 Jun 2021 10:55) (94% - 98%)    PHYSICAL EXAM:    GENERAL: Middle age female looking comfortable    HEENT: PERRL, +EOMI  NECK: soft, Supple, No JVD,   CHEST/LUNG: Clear to auscultate bilaterally; No wheezing  HEART: S1S2+, Regular rate and rhythm; No murmurs  ABDOMEN: Soft, Nontender, Nondistended; Bowel sounds present  EXTREMITIES:  1+ Peripheral Pulses, No edema,   SKIN: No rashes or lesions  NEURO: AAOX3, no focal deficits, no motor r sensory loss  PSYCH: normal mood  Back: has clean dressing on, no bleeding or soaking     LABS:                        11.3   7.79  )-----------( 207      ( 06 Jun 2021 08:33 )             32.8     06-06    139  |  101  |  7.8<L>  ----------------------------<  137<H>  4.0   |  31.0<H>  |  0.65    Ca    8.7      06 Jun 2021 08:33              I&O's Summary    05 Jun 2021 07:01  -  06 Jun 2021 07:00  --------------------------------------------------------  IN: 0 mL / OUT: 805 mL / NET: -805 mL        MEDICATIONS  (STANDING):  atorvastatin 40 milliGRAM(s) Oral at bedtime  buPROPion XL (24-Hour) . 150 milliGRAM(s) Oral daily  cyclobenzaprine 10 milliGRAM(s) Oral every 8 hours  dextrose 40% Gel 15 Gram(s) Oral once  dextrose 40% Gel 15 Gram(s) Oral once  dextrose 5%. 1000 milliLiter(s) (50 mL/Hr) IV Continuous <Continuous>  dextrose 5%. 1000 milliLiter(s) (100 mL/Hr) IV Continuous <Continuous>  dextrose 50% Injectable 12.5 Gram(s) IV Push once  dextrose 50% Injectable 25 Gram(s) IV Push once  dextrose 50% Injectable 25 Gram(s) IV Push once  dextrose 50% Injectable 12.5 Gram(s) IV Push once  dextrose 50% Injectable 25 Gram(s) IV Push once  diltiazem    milliGRAM(s) Oral <User Schedule>  furosemide    Tablet 20 milliGRAM(s) Oral daily  glucagon  Injectable 1 milliGRAM(s) IntraMuscular once  glucagon  Injectable 1 milliGRAM(s) IntraMuscular once  insulin lispro (ADMELOG) corrective regimen sliding scale   SubCutaneous three times a day before meals  isosorbide   mononitrate ER Tablet (IMDUR) 60 milliGRAM(s) Oral daily  lisinopril 5 milliGRAM(s) Oral daily  montelukast 10 milliGRAM(s) Oral at bedtime  oxybutynin 5 milliGRAM(s) Oral at bedtime  pantoprazole    Tablet 40 milliGRAM(s) Oral before breakfast  ranolazine 1000 milliGRAM(s) Oral two times a day  senna 2 Tablet(s) Oral at bedtime  traZODone 100 milliGRAM(s) Oral at bedtime    MEDICATIONS  (PRN):  acetaminophen   Tablet .. 650 milliGRAM(s) Oral every 6 hours PRN Temp greater or equal to 38.5C (101.3F)  ALBUTerol    90 MICROgram(s) HFA Inhaler 2 Puff(s) Inhalation every 6 hours PRN Shortness of Breath and/or Wheezing  ALPRAZolam 0.5 milliGRAM(s) Oral three times a day PRN anxiety  HYDROmorphone   Tablet 4 milliGRAM(s) Oral every 3 hours PRN Severe Pain (7 - 10)  HYDROmorphone  Injectable 0.5 milliGRAM(s) IV Push every 3 hours PRN Breakthrough  naloxone Injectable 0.1 milliGRAM(s) IV Push every 3 minutes PRN For ANY of the following changes in patient status:  A. RR LESS THAN 10 breaths per minute, B. Oxygen saturation LESS THAN 90%, C. Sedation score of 6  ondansetron   Disintegrating Tablet 4 milliGRAM(s) Oral every 6 hours PRN Nausea  ondansetron Injectable 4 milliGRAM(s) IV Push every 6 hours PRN Nausea  oxyCODONE    IR 5 milliGRAM(s) Oral every 3 hours PRN Mild Pain (1 - 3)  oxyCODONE    IR 10 milliGRAM(s) Oral every 3 hours PRN Moderate Pain (4 - 6)

## 2021-06-06 NOTE — PROGRESS NOTE ADULT - SUBJECTIVE AND OBJECTIVE BOX
POD #3 s/p L5/S1 lami PSF/ tlif.  C/o appropriate back pain. NO LE pains/ parasthesias.  OOB walking with PT.  Motor and sensory intact BLLE.  Hct=31 yest.    If HV drain <30/8h it can be d/c'ed  D/C planning for home either later today or tomorrow pending patient's pain control and home assistance situation  Continue OOB.

## 2021-06-06 NOTE — PROGRESS NOTE ADULT - SUBJECTIVE AND OBJECTIVE BOX
[id number]  KATHRYN PARRA    Patient is a 46y Female s/p TLIF L5-S1, POD#3. Patient seen and evaluated at bedside. Patient is doing well. Patient is ambulating with PT. Denies any acute motor or sensory changes. Notes pre-op tingling to left 5th toe has resolved. Patient states she was hypoxic and hypotensive overnight, but denies any SOB or CP. Denies any fever/chills, N/V, numbness/tingling. No other orthopedic complaints.     T(C): 36.9 (06-06-21 @ 04:34), Max: 37.3 (06-05-21 @ 19:47)  HR: 73 (06-06-21 @ 04:34) (73 - 79)  BP: 102/61 (06-06-21 @ 04:34) (97/59 - 122/85)  RR: 18 (06-06-21 @ 04:34) (18 - 18)  SpO2: 96% (06-06-21 @ 04:34) (93% - 98%)                          10.5   8.83  )-----------( 197      ( 05 Jun 2021 06:01 )             31.0       Physical Exam:    General: Awake, alert, in NAD  Spine: Dressing with small serosanguinous staining proximally +drain present (Output 25cc x 12hrs). Drain removed and new dry dressings placed.  Motor exam: [ x ]            Lower extremity                               HF         KE          TA       EHL         GS                                                 R        4/5        5/5        5/5       5/5         5/5                                               L         5/5        5/5       4/5       5/5          5/5    Pain to right hip with right hip flexion    SILT L2-S1 intact B/L, DP pulses 2+ B/L  Calf soft, NT B/L    06-05-21 @ 07:01  -  06-06-21 @ 07:00  --------------------------------------------------------  IN: 0 mL / OUT: 805 mL / NET: -805 mL        A/P: Patient is a 46y Female s/p TLIF L5-S1, POD#3    * ortho stable  * Pain control  * DVTP: SCDs only   * drain d/c'ed  * PT  * WBAT  * Dispo: Home today vs tomorrowwhen cleared by PT and medicine

## 2021-06-06 NOTE — DISCHARGE NOTE NURSING/CASE MANAGEMENT/SOCIAL WORK - PATIENT PORTAL LINK FT
You can access the FollowMyHealth Patient Portal offered by [facility] by registering at the following website: [email or web address]. By joining [email or web address]’s FollowMyHealth portal, you will also be able to view your health information using other applications (apps) compatible with our system.

## 2021-06-06 NOTE — PROGRESS NOTE ADULT - ASSESSMENT
45 yo F with PMH of HTN, HLD, DM2, non-obstructive CAD, coronary artery vasospasm, GERD, depression, former smoker, presents with c/o severe back pain for 1 year after a fall, s/p injections with limited relief, controlled with Percocet prn, now s/p laminectomy L5-S1 and posterior lumbar interbody fusion, posterior spine fusion POD#3.     Problem/Recommendation - 1:  Problem: Spondylolisthesis, lumbosacral region. Recommendation: S/p laminectomy L5-S1 and posterior lumbar interbody fusion, posterior spine fusion POD#3.  Pain control - currently on PCA.  PT.  Antibiotics, wound care, hemovac and DVT prophylaxis as per Ortho.  Incentive spirometry.  Avoid opioid induced constipation.     Problem/Recommendation - 2:  ·  Problem: Coronary vasospasm.  Recommendation: With non-obstructive CAD  Continue Imdur and Ranexa  Recent stress test 5/7/21 normal  Last cath 4/2019 - 30% blockage of LAD.  Continue ASA and Plavix when okay with Ortho; Continue Lipitor.  Follows with Dr. Mock outpatient.      Problem/Recommendation - 3:  ·  Problem: Essential hypertension.  Recommendation: Continue home medications with parameters.      Problem/Recommendation - 4:  ·  Problem: High cholesterol.  Recommendation: Continue statin.      Problem/Recommendation - 5:  ·  Problem: Type 2 diabetes mellitus without complication, without long-term current use of insulin.  Recommendation: FS qAC and qHS with sliding scale coverage.  A1c 6.4.  Hold Januvia while inpatient, resume on discharge.  ADA diet.      Problem/Recommendation - 6:  Problem: Gastroesophageal reflux disease without esophagitis. Recommendation: Continue PPI.     Problem/Recommendation - 7:  Problem: Depression, unspecified depression type. Recommendation: Continue home medications.    hypoxemia: was likely from atelectasis CXR showed no infiltrates, she has no tachycardia, lung exam no added sounds, encouraged incentive spirometry, duo nebs will get EKG,    Problem/Recommendation - 8:  Problem: Need for prophylactic measure. Recommendation: Ambulate as per Ortho.    If she does not require supplemental oxygen,    47 yo F with PMH of HTN, HLD, DM2, non-obstructive CAD, coronary artery vasospasm, GERD, depression, former smoker, presents with c/o severe back pain for 1 year after a fall, s/p injections with limited relief, controlled with Percocet prn, now s/p laminectomy L5-S1 and posterior lumbar interbody fusion, posterior spine fusion POD#3.     Problem/Recommendation - 1:  Problem: Spondylolisthesis, lumbosacral region. Recommendation: S/p laminectomy L5-S1 and posterior lumbar interbody fusion, posterior spine fusion POD#3.  Pain control - currently on PCA.  PT.  Antibiotics, wound care, hemovac and DVT prophylaxis as per Ortho.  Incentive spirometry.  Avoid opioid induced constipation.     Problem/Recommendation - 2:  ·  Problem: Coronary vasospasm.  Recommendation: With non-obstructive CAD  Continue Imdur and Ranexa  Recent stress test 5/7/21 normal  Last cath 4/2019 - 30% blockage of LAD.  Continue ASA and Plavix when okay with Ortho; Continue Lipitor.  Follows with Dr. Mock outpatient.      Problem/Recommendation - 3:  ·  Problem: Essential hypertension.  Recommendation: Continue home medications with parameters.      Problem/Recommendation - 4:  ·  Problem: High cholesterol.  Recommendation: Continue statin.      Problem/Recommendation - 5:  ·  Problem: Type 2 diabetes mellitus without complication, without long-term current use of insulin.  Recommendation: FS qAC and qHS with sliding scale coverage.  A1c 6.4.  Hold Januvia while inpatient, resume on discharge.  ADA diet.      Problem/Recommendation - 6:  Problem: Gastroesophageal reflux disease without esophagitis. Recommendation: Continue PPI.     Problem/Recommendation - 7:  Problem: Depression, unspecified depression type. Recommendation: Continue home medications.    hypoxemia: was likely from atelectasis CXR showed no infiltrates, she has no tachycardia, lung exam no added sounds, encouraged incentive spirometry, duo nebs, EKG was unremarkable, looks like mostly while sleeping, could be exacerbated by pain meds and might have underlying ALEJO, counselled to reduce weight, and might need to get sleep study as out patient.      Problem/Recommendation - 8:  Problem: Need for prophylactic measure. Recommendation: Ambulate as per Ortho.    Patient is stable from the medicine point of view for discharge pending PT and Ortho eval.

## 2021-06-06 NOTE — DISCHARGE NOTE NURSING/CASE MANAGEMENT/SOCIAL WORK - NSDCFUADDAPPT_GEN_ALL_CORE_FT
* We recommend that you call and schedule a follow up appointment within 2-4 weeks with your primary care physician for repeat blood work (CBC and BMP) for post hospital discharge follow-up care.  * Call your surgeon if you have increased redness/pain/drainage or fever. Return to ER for shortness of breath/calf tenderness.    Please avoid narcotic pain meds, do exercise and low caloric diet and see your doctor to ask for sleep study.

## 2021-06-15 ENCOUNTER — RX RENEWAL (OUTPATIENT)
Age: 46
End: 2021-06-15

## 2021-07-07 ENCOUNTER — RX RENEWAL (OUTPATIENT)
Age: 46
End: 2021-07-07

## 2021-07-13 PROBLEM — Z87.891 PERSONAL HISTORY OF NICOTINE DEPENDENCE: Chronic | Status: ACTIVE | Noted: 2021-05-27

## 2021-07-13 PROBLEM — M43.17 SPONDYLOLISTHESIS, LUMBOSACRAL REGION: Chronic | Status: ACTIVE | Noted: 2021-05-27

## 2021-07-13 PROBLEM — K21.9 GASTRO-ESOPHAGEAL REFLUX DISEASE WITHOUT ESOPHAGITIS: Chronic | Status: ACTIVE | Noted: 2021-05-27

## 2021-07-21 ENCOUNTER — NON-APPOINTMENT (OUTPATIENT)
Age: 46
End: 2021-07-21

## 2021-07-21 ENCOUNTER — APPOINTMENT (OUTPATIENT)
Dept: PULMONOLOGY | Facility: CLINIC | Age: 46
End: 2021-07-21
Payer: COMMERCIAL

## 2021-07-21 VITALS
SYSTOLIC BLOOD PRESSURE: 130 MMHG | HEIGHT: 59 IN | OXYGEN SATURATION: 96 % | DIASTOLIC BLOOD PRESSURE: 70 MMHG | WEIGHT: 190 LBS | HEART RATE: 94 BPM | BODY MASS INDEX: 38.3 KG/M2

## 2021-07-21 PROCEDURE — 99072 ADDL SUPL MATRL&STAF TM PHE: CPT

## 2021-07-21 PROCEDURE — 99204 OFFICE O/P NEW MOD 45 MIN: CPT

## 2021-07-21 NOTE — CONSULT LETTER
[Dear  ___] : Dear  [unfilled], [Consult Letter:] : I had the pleasure of evaluating your patient, [unfilled]. [Please see my note below.] : Please see my note below. [Consult Closing:] : Thank you very much for allowing me to participate in the care of this patient.  If you have any questions, please do not hesitate to contact me. [Sincerely,] : Sincerely, [FreeTextEntry3] : Seth Min MD FCCP\par Pulmonary/Critical Care/Sleep Medicine\par Department of Internal Medicine\par \par [facility]\par

## 2021-07-21 NOTE — PHYSICAL EXAM
[No Acute Distress] : no acute distress [Normal Oropharynx] : normal oropharynx [IV] : Mallampati Class: IV [Normal Appearance] : normal appearance [Neck Circumference: ___] : neck circumference: [unfilled] [No Neck Mass] : no neck mass [Normal Rate/Rhythm] : normal rate/rhythm [Normal S1, S2] : normal s1, s2 [No Murmurs] : no murmurs [No Resp Distress] : no resp distress [Clear to Auscultation Bilaterally] : clear to auscultation bilaterally [No Abnormalities] : no abnormalities [Benign] : benign [Normal Gait] : normal gait [No Cyanosis] : no cyanosis [No Clubbing] : no clubbing [No Edema] : no edema [FROM] : FROM [Normal Color/ Pigmentation] : normal color/ pigmentation [No Focal Deficits] : no focal deficits [Oriented x3] : oriented x3 [Normal Affect] : normal affect

## 2021-07-21 NOTE — DISCUSSION/SUMMARY
[FreeTextEntry1] : 46-year-old female, seen today for the above. Patient's complaints of cough, most likely on the basis of bronchospastic disease possibly COPD versus asthma. There is a good therapeutic response to nebulized and short acting bronchodilators.

## 2021-07-21 NOTE — HISTORY OF PRESENT ILLNESS
[Former] : former [TextBox_4] : 46-year-old female with a 22-pack-year history of cigarette smoking DC'd 4 years ago, seen today for complaints of a chronic cough. Patient states that it has been present over the course of the last several years and has been treated with combination of drug nebulization and inhalers. Symptoms occur sporadically, but usually with changes in weather, as well as with sinus congestion, and postnasal drip. She describes her complaints as deep cough with chest tightness without changes in voice, hemoptysis, or sputum production. History is negative for leg edema, paroxysmal nocturnal dyspnea, or orthopnea. She has never been hospitalized for the above. [TextBox_11] : 1 [TextBox_13] : 22 [YearQuit] : 2017 [ESS] : 5

## 2021-09-24 ENCOUNTER — NON-APPOINTMENT (OUTPATIENT)
Age: 46
End: 2021-09-24

## 2021-10-12 ENCOUNTER — APPOINTMENT (OUTPATIENT)
Dept: DISASTER EMERGENCY | Facility: CLINIC | Age: 46
End: 2021-10-12

## 2021-10-15 ENCOUNTER — APPOINTMENT (OUTPATIENT)
Dept: PULMONOLOGY | Facility: CLINIC | Age: 46
End: 2021-10-15

## 2021-10-30 ENCOUNTER — NON-APPOINTMENT (OUTPATIENT)
Age: 46
End: 2021-10-30

## 2021-11-15 ENCOUNTER — RESULT CHARGE (OUTPATIENT)
Age: 46
End: 2021-11-15

## 2021-11-16 ENCOUNTER — APPOINTMENT (OUTPATIENT)
Dept: CARDIOLOGY | Facility: CLINIC | Age: 46
End: 2021-11-16
Payer: COMMERCIAL

## 2021-11-16 VITALS
DIASTOLIC BLOOD PRESSURE: 70 MMHG | BODY MASS INDEX: 38.3 KG/M2 | HEART RATE: 72 BPM | SYSTOLIC BLOOD PRESSURE: 116 MMHG | WEIGHT: 190 LBS | RESPIRATION RATE: 16 BRPM | HEIGHT: 59 IN

## 2021-11-16 PROCEDURE — 99214 OFFICE O/P EST MOD 30 MIN: CPT

## 2021-11-16 PROCEDURE — 93000 ELECTROCARDIOGRAM COMPLETE: CPT

## 2021-11-16 RX ORDER — SITAGLIPTIN 100 MG/1
100 TABLET, FILM COATED ORAL DAILY
Refills: 0 | Status: DISCONTINUED | COMMUNITY
End: 2021-11-16

## 2021-11-16 RX ORDER — ISOSORBIDE MONONITRATE 30 MG/1
30 TABLET, EXTENDED RELEASE ORAL DAILY
Qty: 90 | Refills: 0 | Status: DISCONTINUED | COMMUNITY
End: 2021-11-16

## 2021-11-16 RX ORDER — FLUTICASONE FUROATE AND VILANTEROL TRIFENATATE 200; 25 UG/1; UG/1
200-25 POWDER RESPIRATORY (INHALATION) DAILY
Qty: 1 | Refills: 5 | Status: DISCONTINUED | COMMUNITY
Start: 2021-07-21 | End: 2021-11-16

## 2021-11-16 RX ORDER — OXYCODONE HYDROCHLORIDE 30 MG/1
TABLET ORAL
Refills: 0 | Status: DISCONTINUED | COMMUNITY
End: 2021-11-16

## 2021-11-19 NOTE — HISTORY OF PRESENT ILLNESS
[FreeTextEntry1] : Mrs. Wright states that on October 30th she developed retrosternal chest pain while at work which radiated to her back, neck and jaw.  Patient presented to the emergency room at [place] where she underwent extensive evaluation and was subsequently discharged.  Patient states that she had stopped taking her Ranexa some two weeks prior to this incident.  She states that nitroglycerine prior to presenting in the hospital did not improve her symptoms.

## 2021-11-19 NOTE — REASON FOR VISIT
[FreeTextEntry1] : Mrs. Wright is a pleasant 46-year-old white female with a past medical history significant for hyperlipidemia, diabetes mellitus, tobacco dependence, asthma, non-obstructive coronary artery disease with associated coronary artery spasm, who presents for follow up evaluation.  \par \par

## 2021-11-19 NOTE — ASSESSMENT
[FreeTextEntry1] : 1.  EKG today reveals normal sinus rhythm at 72 bpm.  Normal intervals.  Non-specific ST-T changes.  \par \par 2.  Chest pain syndrome:  Patient with known non-obstructive coronary artery disease and cardiac catheterization but with suggested coronary spasm.  She has been well controlled in the past with Ranexa but discontinued it two weeks prior to this latest episode because of financial cost.  She is back on Ranexa now and has not had further episodes.  I have advised her to undergo a CTA of the aorta with and without contrast to exclude the possibility of non-catastrophic aortic dissection.  She will continue all current medications and avoid any unnecessary exertion until further notice.  Follow up office visit 2-3 weeks.  \par

## 2021-11-26 ENCOUNTER — OUTPATIENT (OUTPATIENT)
Dept: OUTPATIENT SERVICES | Facility: HOSPITAL | Age: 46
LOS: 1 days | End: 2021-11-26
Payer: COMMERCIAL

## 2021-11-26 ENCOUNTER — APPOINTMENT (OUTPATIENT)
Dept: CT IMAGING | Facility: CLINIC | Age: 46
End: 2021-11-26
Payer: COMMERCIAL

## 2021-11-26 DIAGNOSIS — Z98.1 ARTHRODESIS STATUS: Chronic | ICD-10-CM

## 2021-11-26 DIAGNOSIS — Z98.890 OTHER SPECIFIED POSTPROCEDURAL STATES: Chronic | ICD-10-CM

## 2021-11-26 DIAGNOSIS — R07.9 CHEST PAIN, UNSPECIFIED: ICD-10-CM

## 2021-11-26 DIAGNOSIS — Z90.49 ACQUIRED ABSENCE OF OTHER SPECIFIED PARTS OF DIGESTIVE TRACT: Chronic | ICD-10-CM

## 2021-11-26 DIAGNOSIS — Z98.891 HISTORY OF UTERINE SCAR FROM PREVIOUS SURGERY: Chronic | ICD-10-CM

## 2021-11-26 PROCEDURE — 82565 ASSAY OF CREATININE: CPT

## 2021-11-26 PROCEDURE — 71275 CT ANGIOGRAPHY CHEST: CPT

## 2021-11-26 PROCEDURE — 71275 CT ANGIOGRAPHY CHEST: CPT | Mod: 26

## 2021-12-09 ENCOUNTER — NON-APPOINTMENT (OUTPATIENT)
Age: 46
End: 2021-12-09

## 2021-12-09 ENCOUNTER — APPOINTMENT (OUTPATIENT)
Dept: CARDIOLOGY | Facility: CLINIC | Age: 46
End: 2021-12-09
Payer: COMMERCIAL

## 2021-12-09 VITALS
HEIGHT: 60 IN | SYSTOLIC BLOOD PRESSURE: 132 MMHG | RESPIRATION RATE: 16 BRPM | WEIGHT: 190 LBS | BODY MASS INDEX: 37.3 KG/M2 | HEART RATE: 70 BPM | DIASTOLIC BLOOD PRESSURE: 60 MMHG

## 2021-12-09 PROCEDURE — 99214 OFFICE O/P EST MOD 30 MIN: CPT

## 2021-12-09 RX ORDER — MULTIVIT-MIN/FOLIC/VIT K/LYCOP 400-300MCG
1000 TABLET ORAL
Refills: 0 | Status: DISCONTINUED | COMMUNITY
End: 2021-12-09

## 2021-12-09 NOTE — HISTORY OF PRESENT ILLNESS
[FreeTextEntry1] : Mrs. Wright presents today without any further complaints of chest pain after restarting the Ranexa 1000mg BID.  Denies shortness of breath, palpitations, lightheadedness or syncope.

## 2021-12-09 NOTE — PHYSICAL EXAM
[Well Developed] : well developed [Well Nourished] : well nourished [No Acute Distress] : no acute distress [Obese] : obese [Normal Conjunctiva] : normal conjunctiva [Normal Venous Pressure] : normal venous pressure [No Carotid Bruit] : no carotid bruit [Normal S1, S2] : normal S1, S2 [No Murmur] : no murmur [No Rub] : no rub [No Gallop] : no gallop [Clear Lung Fields] : clear lung fields [No Respiratory Distress] : no respiratory distress  [Soft] : abdomen soft [Normal Bowel Sounds] : normal bowel sounds [Normal Gait] : normal gait [No Edema] : no edema [No Rash] : no rash [No Skin Lesions] : no skin lesions [Moves all extremities] : moves all extremities [No Focal Deficits] : no focal deficits [Normal Speech] : normal speech [Alert and Oriented] : alert and oriented [Normal memory] : normal memory

## 2021-12-09 NOTE — DISCUSSION/SUMMARY
[FreeTextEntry1] : 1 - Chest pain syndrome:  presents today without any further complaints of chest pain after restarting the Ranexa 1000mg BID.  Denies shortness of breath, palpitations, lightheadedness or syncope.  Recently underwent CTA of the aorta which revealed the aorta is normal in caliber and there are no acute intramural hematoma, aneurysm or dissection.\par \par 2 - Recent labs:  TSH 4.55, Triglycerides 232, cholesterol 174, HDL 55, LDL 87, TC/HDL 3.2, glucose 151, BUN 12, creatinine 0.81, potassium 4.4, H/H 14/42.3, platelets 262, HgA1c 6, vitamin D 22.\par \par 3 - Follow up with Dr. Mock in 3 months.

## 2021-12-09 NOTE — REASON FOR VISIT
[FreeTextEntry1] : Mrs. Wright is a pleasant 46-year-old white female with a past medical history significant for hyperlipidemia, diabetes mellitus, tobacco dependence, asthma, non-obstructive coronary artery disease with associate coronary artery spasm, who presents for follow up evaluation.

## 2021-12-17 ENCOUNTER — APPOINTMENT (OUTPATIENT)
Dept: PULMONOLOGY | Facility: CLINIC | Age: 46
End: 2021-12-17
Payer: COMMERCIAL

## 2021-12-17 VITALS
DIASTOLIC BLOOD PRESSURE: 70 MMHG | RESPIRATION RATE: 16 BRPM | OXYGEN SATURATION: 98 % | HEART RATE: 71 BPM | SYSTOLIC BLOOD PRESSURE: 122 MMHG

## 2021-12-17 VITALS — HEIGHT: 59 IN | BODY MASS INDEX: 38.1 KG/M2 | WEIGHT: 189 LBS

## 2021-12-17 PROCEDURE — 94727 GAS DIL/WSHOT DETER LNG VOL: CPT

## 2021-12-17 PROCEDURE — 94729 DIFFUSING CAPACITY: CPT

## 2021-12-17 PROCEDURE — 94010 BREATHING CAPACITY TEST: CPT

## 2021-12-17 PROCEDURE — 99214 OFFICE O/P EST MOD 30 MIN: CPT | Mod: 25

## 2021-12-17 PROCEDURE — 85018 HEMOGLOBIN: CPT | Mod: QW

## 2021-12-17 NOTE — CONSULT LETTER
[Dear  ___] : Dear  [unfilled], [Consult Letter:] : I had the pleasure of evaluating your patient, [unfilled]. [Please see my note below.] : Please see my note below. [Consult Closing:] : Thank you very much for allowing me to participate in the care of this patient.  If you have any questions, please do not hesitate to contact me. [Sincerely,] : Sincerely, [FreeTextEntry3] : Seth Min MD FCCP\par Pulmonary/Critical Care/Sleep Medicine\par Department of Internal Medicine\par \par [facility]\par

## 2021-12-17 NOTE — DISCUSSION/SUMMARY
[FreeTextEntry1] : 46-year-old female seen today for the above.  Patient's complaints of cough appear to be mostly on the basis of reactive airways disease and in light of normal pulmonary function test I feel this is most likely asthma in origin.  Course also appears complicated by poorly controlled gastroesophageal reflux.  She was advised to add over-the-counter Zantac to her regimen.  In addition she was being referred to gastroenterology

## 2021-12-17 NOTE — HISTORY OF PRESENT ILLNESS
[Former] : former [TextBox_4] : 11/21/2021:\par 46-year-old female with a 22-pack-year history of cigarette smoking DC'd 4 years ago, seen today for complaints of a chronic cough. Patient states that it has been present over the course of the last several years and has been treated with combination of drug nebulization and inhalers. Symptoms occur sporadically, but usually with changes in weather, as well as with sinus congestion, and postnasal drip. She describes her complaints as deep cough with chest tightness without changes in voice, hemoptysis, or sputum production. History is negative for leg edema, paroxysmal nocturnal dyspnea, or orthopnea. She has never been hospitalized for the above.\par \par Patient was felt to have significant reactive airways disease and placed on Breo.\par \par 12/17/2021:\par Patient completed her course of Breo with a significant improvement in symptoms.  She felt normal.  Unfortunately her insurance did not cover this agent and she did not look for an alternative.  She does note some intermittent wheezing at the present time\par History also positive for reflux despite pantoprazole [TextBox_11] : 1 [TextBox_13] : 22 [YearQuit] : 2017 [ESS] : 5

## 2022-03-15 ENCOUNTER — RX RENEWAL (OUTPATIENT)
Age: 47
End: 2022-03-15

## 2022-03-16 ENCOUNTER — APPOINTMENT (OUTPATIENT)
Dept: CARDIOLOGY | Facility: CLINIC | Age: 47
End: 2022-03-16
Payer: COMMERCIAL

## 2022-03-16 VITALS
HEIGHT: 59 IN | DIASTOLIC BLOOD PRESSURE: 64 MMHG | HEART RATE: 63 BPM | SYSTOLIC BLOOD PRESSURE: 124 MMHG | WEIGHT: 187 LBS | RESPIRATION RATE: 12 BRPM | BODY MASS INDEX: 37.7 KG/M2

## 2022-03-16 PROCEDURE — 99214 OFFICE O/P EST MOD 30 MIN: CPT

## 2022-03-16 PROCEDURE — 93000 ELECTROCARDIOGRAM COMPLETE: CPT

## 2022-03-16 RX ORDER — RAMIPRIL 1.25 MG/1
1.25 CAPSULE ORAL
Qty: 90 | Refills: 3 | Status: DISCONTINUED | COMMUNITY
Start: 1900-01-01 | End: 2022-03-16

## 2022-03-16 RX ORDER — CETIRIZINE HCL 10 MG
10 TABLET ORAL DAILY
Refills: 0 | Status: DISCONTINUED | COMMUNITY
End: 2022-03-16

## 2022-03-16 NOTE — CONSULT NOTE ADULT - PROBLEM SELECTOR PROBLEM 2
Noted see TSH recommendations.  
Patient of Dr. Del Cid.  Thyroid function is stable    See TSH result note. 
Coronary vasospasm

## 2022-03-17 NOTE — ASSESSMENT
[FreeTextEntry1] : \par 1.  EKG today reveals sinus rhythm at 63 bpm.  Normal intervals.  No evidence of ischemia. \par \par 2.  Coronary artery spasm:  Clinically stable on Ranexa.  Remains reasonably active. \par \par 3.  Chronic cough:  patient with cough previously attributed to reactive airway disease/asthma and possibly chronic GERD.  Currently on low-dose Ramipril for kidney protection.  Will ask patient to discontinue Ramipril temporarily and return to see me in six weeks for reassessment.   \par

## 2022-03-17 NOTE — REASON FOR VISIT
[FreeTextEntry1] : Mrs. Wright is a pleasant 46-year-old white female with a past medical history significant for hyperlipidemia, diabetes mellitus, prior tobacco dependence, asthma, and non-obstructive coronary artery disease with associated coronary artery spasm, who presents for follow up evaluation.  \par \par

## 2022-03-17 NOTE — HISTORY OF PRESENT ILLNESS
[FreeTextEntry1] : Mrs. Wright states she feels “well.”  She is back on Ranexa and has had no significant chest pain.  She does continues to complain about chronic cough which has been previously attributed to her underlying asthma.  \par

## 2022-03-25 ENCOUNTER — APPOINTMENT (OUTPATIENT)
Dept: PULMONOLOGY | Facility: CLINIC | Age: 47
End: 2022-03-25

## 2022-03-30 ENCOUNTER — RX RENEWAL (OUTPATIENT)
Age: 47
End: 2022-03-30

## 2022-05-05 ENCOUNTER — RX RENEWAL (OUTPATIENT)
Age: 47
End: 2022-05-05

## 2022-05-19 ENCOUNTER — RX RENEWAL (OUTPATIENT)
Age: 47
End: 2022-05-19

## 2022-05-19 ENCOUNTER — APPOINTMENT (OUTPATIENT)
Dept: CARDIOLOGY | Facility: CLINIC | Age: 47
End: 2022-05-19
Payer: COMMERCIAL

## 2022-05-19 VITALS
BODY MASS INDEX: 39.11 KG/M2 | HEART RATE: 67 BPM | SYSTOLIC BLOOD PRESSURE: 126 MMHG | DIASTOLIC BLOOD PRESSURE: 76 MMHG | HEIGHT: 59 IN | WEIGHT: 194 LBS | RESPIRATION RATE: 17 BRPM

## 2022-05-19 PROCEDURE — 93000 ELECTROCARDIOGRAM COMPLETE: CPT

## 2022-05-19 PROCEDURE — 99214 OFFICE O/P EST MOD 30 MIN: CPT

## 2022-05-19 RX ORDER — BUPROPION HYDROCHLORIDE 150 MG/1
150 TABLET, FILM COATED ORAL AT BEDTIME
Refills: 0 | Status: DISCONTINUED | COMMUNITY
End: 2022-05-19

## 2022-05-20 NOTE — ASSESSMENT
[FreeTextEntry1] : \par \par 1.  EKG today reveals normal sinus rhythm at 67 bpm.  Normal intervals.  Poor R-wave progression leads V1 through V3.  Non-specific ST-T changes.  \par \par 2.  Hyperlipidemia:  She recently underwent bloodwork which I have not had a chance to review as of yet.  I have advised her to follow a low-fat / low-cholesterol diet and continue current medications.  \par \par 3.  Non-obstructive coronary artery disease/coronary artery spasm:  Clinically stable at this time on combination Diltiazem and Ranexa, as well as nitrates.  \par \par 4.  Peripheral edema:  This may be a multifactorial issue but clearly could be the consequence of Diltiazem.  Patient is obese as well.  Have advised her to increase Furosemide to an alternating schedule of 20 and 40 mg on an every-other-day basis.  She will begin potassium supplementation at 10 mEq daily.  Repeat renal function and an office visit in six weeks. \par \par 5.  Chronic cough:  Patient with no further cough following discontinuation of ACE inhibitor.  I have advised her to follow up with her PCP regarding possible ARB therapy for preventing renal protein loss.  \par \par 6.  Bruising:  Patient states that she is bruising on both aspirin and Plavix.  Recently held aspirin for two weeks and her bruising improved dramatically.  I have advised her hold aspirin but continue with Plavix.       \par

## 2022-05-20 NOTE — REASON FOR VISIT
[FreeTextEntry1] : Mrs. Wright is a pleasant 47-year-old white female with a past medical history significant for hyperlipidemia, diabetes mellitus, asthma, with prior tobacco dependece, and non-obstructive coronary artery disease with associated coronary artery spasm, who presents for follow up evaluation.  \par \par

## 2022-06-09 ENCOUNTER — RX RENEWAL (OUTPATIENT)
Age: 47
End: 2022-06-09

## 2022-08-04 ENCOUNTER — APPOINTMENT (OUTPATIENT)
Dept: CARDIOLOGY | Facility: CLINIC | Age: 47
End: 2022-08-04
Payer: COMMERCIAL

## 2022-08-04 ENCOUNTER — NON-APPOINTMENT (OUTPATIENT)
Age: 47
End: 2022-08-04

## 2022-08-04 VITALS
WEIGHT: 190 LBS | HEIGHT: 59 IN | DIASTOLIC BLOOD PRESSURE: 70 MMHG | BODY MASS INDEX: 38.3 KG/M2 | HEART RATE: 66 BPM | SYSTOLIC BLOOD PRESSURE: 118 MMHG | RESPIRATION RATE: 17 BRPM

## 2022-08-04 DIAGNOSIS — J18.9 PNEUMONIA, UNSPECIFIED ORGANISM: ICD-10-CM

## 2022-08-04 PROCEDURE — 99214 OFFICE O/P EST MOD 30 MIN: CPT | Mod: 25

## 2022-08-04 PROCEDURE — 93000 ELECTROCARDIOGRAM COMPLETE: CPT

## 2022-08-04 RX ORDER — SOLIFENACIN SUCCINATE 5 MG/1
5 TABLET ORAL
Qty: 30 | Refills: 0 | Status: COMPLETED | COMMUNITY
Start: 2022-01-25

## 2022-08-04 RX ORDER — DESVENLAFAXINE 100 MG/1
100 TABLET, EXTENDED RELEASE ORAL
Qty: 90 | Refills: 0 | Status: ACTIVE | COMMUNITY
Start: 2022-02-24

## 2022-08-04 RX ORDER — ASPIRIN ENTERIC COATED TABLETS 81 MG 81 MG/1
81 TABLET, DELAYED RELEASE ORAL
Refills: 0 | Status: DISCONTINUED | COMMUNITY
Start: 2019-07-18 | End: 2022-08-04

## 2022-08-04 RX ORDER — MONTELUKAST SODIUM 10 MG/1
10 TABLET, FILM COATED ORAL
Refills: 0 | Status: ACTIVE | COMMUNITY

## 2022-08-04 RX ORDER — AMOXICILLIN AND CLAVULANATE POTASSIUM 875; 125 MG/1; MG/1
875-125 TABLET, COATED ORAL
Qty: 20 | Refills: 0 | Status: COMPLETED | COMMUNITY
Start: 2022-07-18

## 2022-08-04 RX ORDER — SOLIFENACIN SUCCINATE 10 MG/1
10 TABLET, FILM COATED ORAL
Refills: 0 | Status: ACTIVE | COMMUNITY

## 2022-08-04 RX ORDER — HYDROCODONE BITARTRATE AND ACETAMINOPHEN 5; 325 MG/1; MG/1
5-325 TABLET ORAL
Qty: 30 | Refills: 0 | Status: COMPLETED | COMMUNITY
Start: 2022-02-22

## 2022-08-04 RX ORDER — ALPRAZOLAM 0.5 MG/1
0.5 TABLET ORAL
Qty: 30 | Refills: 0 | Status: ACTIVE | COMMUNITY
Start: 2022-03-04

## 2022-08-04 RX ORDER — TRAZODONE HYDROCHLORIDE 100 MG/1
100 TABLET ORAL
Qty: 270 | Refills: 0 | Status: ACTIVE | COMMUNITY
Start: 2022-03-04

## 2022-08-04 RX ORDER — AZITHROMYCIN 250 MG/1
250 TABLET, FILM COATED ORAL
Qty: 6 | Refills: 0 | Status: COMPLETED | COMMUNITY
Start: 2022-07-24

## 2022-08-04 RX ORDER — DESVENLAFAXINE SUCCINATE 25 MG/1
TABLET, EXTENDED RELEASE ORAL
Refills: 0 | Status: DISCONTINUED | COMMUNITY
End: 2022-08-04

## 2022-08-04 RX ORDER — MULTIVITAMIN
TABLET ORAL
Refills: 0 | Status: DISCONTINUED | COMMUNITY
End: 2022-08-04

## 2022-08-04 RX ORDER — BUPROPION HYDROCHLORIDE 150 MG/1
150 TABLET, EXTENDED RELEASE ORAL
Qty: 30 | Refills: 0 | Status: COMPLETED | COMMUNITY
Start: 2021-09-09

## 2022-08-04 RX ORDER — POTASSIUM CHLORIDE 750 MG/1
10 TABLET, FILM COATED, EXTENDED RELEASE ORAL TWICE DAILY
Qty: 180 | Refills: 3 | Status: DISCONTINUED | COMMUNITY
Start: 2022-05-19 | End: 2022-08-04

## 2022-08-04 RX ORDER — PANTOPRAZOLE 40 MG/1
40 TABLET, DELAYED RELEASE ORAL
Qty: 90 | Refills: 1 | Status: DISCONTINUED | COMMUNITY
Start: 2022-06-09 | End: 2022-08-04

## 2022-08-04 NOTE — CARDIOLOGY SUMMARY
[de-identified] : Sinus rhythm at 66 bpm.  Normal intervals.  Poor R-wave progression in V1-V3.  Non-specific ST-T changes.

## 2022-08-04 NOTE — DISCUSSION/SUMMARY
[FreeTextEntry1] : 1 - Non-obstructive coronary artery disease/coronary artery spasm:  patient has been without complaints of exertional chest pain, shortness of breath, palpitations, lightheadedness or syncope.\par \par 2 - Peripheral edema:  Her bilateral lower extremity edema has improved significantly with the increase in the furosemide.  BUN 14, creatinine 0.84, potassium 4.7.\par \par 3 - Chronic cough:  Her dry cough resolved with the discontinuation of ACE inhibitor.  She recently spoke with her PCP and she will be started on losartan for preventing renal protein loss once her pneumonia is resolved.\par \par 4 - Pneumonia:  She is presently getting over pneumonia.  Has been on levaquin and nebulizer treatments for the the past week and will be starting prednisone today.  WIll follow up with her PCP in the next few days.\par \par 5 - Fasting blood work prior to follow up visit.\par \par 6 - Follow up with Dr. Mock in 3 months.

## 2022-08-04 NOTE — HISTORY OF PRESENT ILLNESS
[FreeTextEntry1] : Mrs. Wright presents today without complaints of exertional chest, palpitations, lightheadedness or syncope.  She is presently getting over pneumonia.  Has been on levaquin and nebulizer treatments for the the past week and will be starting prednisone today.  Her bilateral lower extremity edema has improved significantly with the increase in the furosemide.

## 2022-08-04 NOTE — REASON FOR VISIT
[FreeTextEntry1] : Mrs. Wright is a pleasant 47-year-old white female with a past medical history significant for hyperlipidemia, diabetes mellitus, asthma, with prior tobacco dependence, and non-obstructive coronary artery disease with associated coronary artery spasm, who presents for follow up evaluation.  \par \par

## 2022-09-08 NOTE — ED CDU PROVIDER SUBSEQUENT DAY NOTE - ATTENDING CONTRIBUTION TO CARE
[facility]    [address], [address] [facility]    Phone: [phone]    Fax: [phone] Patient in obs for chest pain.  VSS.  pending TTE.  Uneventful ED observation period. Non toxic.  Well appearing. will  continue to monitor pending testing.

## 2022-09-20 ENCOUNTER — APPOINTMENT (OUTPATIENT)
Dept: ORTHOPEDIC SURGERY | Facility: CLINIC | Age: 47
End: 2022-09-20

## 2022-09-20 VITALS — WEIGHT: 190 LBS | BODY MASS INDEX: 38.3 KG/M2 | HEIGHT: 59 IN

## 2022-09-20 DIAGNOSIS — Z87.09 PERSONAL HISTORY OF OTHER DISEASES OF THE RESPIRATORY SYSTEM: ICD-10-CM

## 2022-09-20 DIAGNOSIS — Z86.39 PERSONAL HISTORY OF OTHER ENDOCRINE, NUTRITIONAL AND METABOLIC DISEASE: ICD-10-CM

## 2022-09-20 DIAGNOSIS — I73.9 PERIPHERAL VASCULAR DISEASE, UNSPECIFIED: ICD-10-CM

## 2022-09-20 DIAGNOSIS — I25.2 OLD MYOCARDIAL INFARCTION: ICD-10-CM

## 2022-09-20 PROCEDURE — 73564 X-RAY EXAM KNEE 4 OR MORE: CPT | Mod: LT

## 2022-09-20 PROCEDURE — 99214 OFFICE O/P EST MOD 30 MIN: CPT | Mod: 25

## 2022-09-20 PROCEDURE — 20611 DRAIN/INJ JOINT/BURSA W/US: CPT

## 2022-09-20 NOTE — DISCUSSION/SUMMARY
[de-identified] : **Blood thinners / no NSAIDs \par \par Aspirated 60ml clear synovial fluid from L knee using ultrasound\par \par Cortisone Knee Injection - Left\par RB&A to corticosteroid injection discussed.\par All questions were answered.\par Patient wishes to move forward with injection today. \par The risks, benefits, and alternatives to cortisone injection were explained in full to the patient.  Risks outlined include but are not limited to infection, sepsis, bleeding, scarring, skin discoloration, temporary increase in pain, syncopal episode, failure to resolve symptoms, allergic reaction, symptom recurrence, and elevation of blood sugar in diabetics.  Patient understood the risks.  All questions were answered.  After discussion of options, patient requested an injection.  Oral informed consent was obtained and sterile prep was done of the injection site.  A mixture of 40mg of Kenalog, 2cc of 1% Lidocaine was sterilely prepared by me.  Sterile technique was used to introduce the mixture into the left knee. Ultrasound was used for proper needle placement.  Patient tolerated the procedure well.  Advised to ice the injection site this evening. \par \par Patient will follow up in 6 weeks \par -----------------------------------------------\par Home Exercise\par The patient is instructed on a home exercise program.\par \par HEATH ROCA Acting as a Scribe for Dr. Marie\par I, Heath Roca, attest that this documentation has been prepared under the direction and in the presence of Provider Chacho Marie MD.\par \par Activity Modification\par The patient was advised to modify their activities.\par \par Dx / Natural History\par The patient was advised of the diagnosis.  The natural history of the pathology was explained in full to the patient in layman's terms.  Several different treatment options were discussed and explained in full to the patient including the risks and benefits of both surgical and non-surgical treatments.  All questions and concerns were answered.\par \par Pain Guide Activities\par The patient was advised to let pain guide the gradual advancement of activities.\par \par RICE\par I explained to the patient that rest, ice, compression, and elevation would benefit them.  They may return to activity after follow-up or when they no longer have any pain.

## 2022-09-20 NOTE — PHYSICAL EXAM
[de-identified] : Neurologic: normal coordination, normal DTR UE/LE , normal sensation, normal mood and affect, orientated and able to communicate. \par Skin: normal skin, no rash, no ulcers and no lesions. \par Lymphatic: no obvious lymphadenopathy in areas examined. \par Constitutional: well developed and well nourished. \par Cardiovascular: peripheral vascular exam is grossly normal. \par Pulmonary: no respiratory distress, lungs clear to auscultation bilaterally. \par Abdomen: normal bowel sounds, non-tender, no HSM and no mass. \par \par Left Knee: Medial joint line tenderness\par Large effusion \par Medial facet of patella tenderness\par Positive Sha's test \par x-ray 4 views: Grade 3 medial osteoarthritis

## 2022-09-20 NOTE — HISTORY OF PRESENT ILLNESS
[de-identified] : The patient is a 47 year old R hand dominant female who presents today complaining of L knee pain and swelling.  \par Date of Injury/Onset: 09/018/2022, swelling onset 09/20/22\par Pain:    At Rest: 6/10 \par With Activity:  9/10 \par Mechanism of injury: Insidious\par This is not a Work Related Injury being treated under Worker's Compensation.\par This is not an athletic injury occurring associated with an interscholastic or organized sports team.\par Quality of symptoms: Shooting, sharp, pressure\par Improves with: N/A\par Worse with: Walking, prolonged sitting \par Prior treatment: N/A\par Prior Imaging: Ultrasound\par Out of work/sport: _, since _\par School/Sport/Position/Occupation: Ultrasound tech\par Additional Information: PMHx of meniscal surgery in the involved knee\par

## 2022-10-03 NOTE — PATIENT PROFILE ADULT - HOW PATIENT ADDRESSED, PROFILE

## 2022-10-22 NOTE — ED ADULT NURSE REASSESSMENT NOTE - CONDITION
Colon and Rectal Surgery Procedure History & Physical    Date of Procedure:  10/22/2022   Referring Provider: Kasandra Ray MD      PROCEDURE(S):  Colonoscopy      REASON FOR PROCEDURE(S)  Personal history of colon adenoma(s)    HPI:    This is a 60 year old female for scheduled GI Procedure(s) and indications as stated above.    MEDICATIONS:  Current Outpatient Medications   Medication Sig   • levothyroxine 50 MCG tablet Take 50 mcg by mouth daily.     No current facility-administered medications for this encounter.     ALLERGIES:  No Known Allergies     PAST MEDICAL HISTORY:    COVID-19                                        2021          SECTION, LOW TRANSVERSE                              HYSTERECTOMY                                              THYROID LOBECTOMY,UNILAT                                      BILATERAL SALPINGOOPHORECTOMY                             COLONOSCOPY WITH POLYPECTOMY 2019    FAMILY HISTORY:  Family History   Problem Relation Age of Onset   • Diabetes Mother    • Coronary Artery Disease Mother    • Diabetes Father    • Patient is unaware of any medical problems Sister    • Patient is unaware of any medical problems Brother    • Patient is unaware of any medical problems Daughter        SOCIAL HISTORY:  Social History     Socioeconomic History   • Marital status:      Spouse name: Not on file   • Number of children: Not on file   • Years of education: Not on file   • Highest education level: Not on file   Occupational History   • Occupation: part time -    Tobacco Use   • Smoking status: Current Every Day Smoker     Packs/day: 1.00     Years: 44.00     Pack years: 44.00     Types: Cigarettes   • Smokeless tobacco: Never Used   Substance and Sexual Activity   • Alcohol use: Yes     Comment: 3-4 drinks daily   • Drug use: Never   • Sexual activity: Not on file   Other Topics Concern   • Not on file   Social History Narrative   • Not on file     Social  Determinants of Health     Financial Resource Strain: Not on file   Food Insecurity: Not on file   Transportation Needs: Not on file   Physical Activity: Not on file   Stress: Not on file   Social Connections: Not on file   Intimate Partner Violence: Not on file          REVIEW OF SYSTEMS:  All systems reviewed and negative except as mentioned in HPI      PHYSICAL EXAM:  Vitals:  There were no vitals taken for this visit. (see nurses note for current vitals)  Constitutional:  General appearance without acute distress  Skin:  No jaundice on inspection.  Skin is warm and dry on palpation  Eyes:  Normal conjunctiva, anicteric  Oral:  Lips, teeth and gums normal on inspection  Neck: trachea midline, no cervical nodes /masses  Pulmonary:  Clear to auscultation bilaterally good respiratory effort  Cardiovascular:  Regular rate on auscultation.  No lower extremity edema  Abdomen:  Positive bowel sound, soft, non-tender, no distention.  No hepatosplenomegaly  Extremities: No pedal edema   Neuro: No gross motor deficit, movements coordinated  Psych:  Mood and affect were appropriate.  Judgement and insight appear appropriate    LABS AND IMAGING:  Reviewed in Epic      ASSESSMENT:  Personal history of colon polyps, 2019       PLAN:   Colonoscopy with gen anesth.    Risks, benefits, alternatives, expectations, and preparations were described in detail.  Patient understands and agrees.               improved

## 2022-10-25 ENCOUNTER — RESULT REVIEW (OUTPATIENT)
Age: 47
End: 2022-10-25

## 2022-11-08 NOTE — PHYSICAL THERAPY INITIAL EVALUATION ADULT - DIAGNOSIS, PT EVAL
Osiris Obrien was seen and treated in our emergency department on 11/8/2022. She may return to work on 11/09/2022. If you have any questions or concerns, please don't hesitate to call.       Nazia Craig MD Impaired functional mobility

## 2022-11-09 ENCOUNTER — APPOINTMENT (OUTPATIENT)
Dept: CARDIOLOGY | Facility: CLINIC | Age: 47
End: 2022-11-09
Payer: COMMERCIAL

## 2022-11-09 VITALS
WEIGHT: 195 LBS | HEIGHT: 65 IN | BODY MASS INDEX: 32.49 KG/M2 | HEART RATE: 76 BPM | RESPIRATION RATE: 16 BRPM | SYSTOLIC BLOOD PRESSURE: 118 MMHG | DIASTOLIC BLOOD PRESSURE: 76 MMHG

## 2022-11-09 PROCEDURE — 93000 ELECTROCARDIOGRAM COMPLETE: CPT

## 2022-11-09 PROCEDURE — 99214 OFFICE O/P EST MOD 30 MIN: CPT | Mod: 25

## 2022-11-10 NOTE — REASON FOR VISIT
[FreeTextEntry1] : Mrs. Wright is a pleasant 47-year-old obese white female with a past medical history significant for hyperlipidemia, diabetes mellitus, asthma, with prior tobacco dependance, and non-obstructive coronary artery disease with associated coronary artery spasm, who presents for follow up evaluation.  \par \par

## 2022-11-10 NOTE — ASSESSMENT
[FreeTextEntry1] : 1.  EKG today reveals sinus rhythm at 76 bpm.  No evidence of ischemia. \par \par 2.  Hyperlipidemia:  No recent bloodwork available for review.  Patient advised to continue current medications and follow a strict low-fat / low-cholesterol diet . \par \par 3.  Review of recent bloodwork demonstrates BUN 14, creatinine 0.84.  Patient current on diuretic therapy.  \par \par 4.  Coronary artery disease with associated spasm:  Patient without symptoms at this time.  Appears to be well-compensated with combination Ranexa, Diltiazem, and isosorbide mononitrate.  \par \par 5.  Obesity:  Patient advised on a low-carbohydrate diet and should be exercising on a regular basis as soon as her left knee condition improves.  Advised to continue with Weight Watcher’s in the meantime.  Fasting bloodwork to be performed prior to her next office visit in three months.  \par  \par

## 2022-12-23 ENCOUNTER — APPOINTMENT (OUTPATIENT)
Dept: PULMONOLOGY | Facility: CLINIC | Age: 47
End: 2022-12-23
Payer: COMMERCIAL

## 2022-12-23 VITALS — WEIGHT: 192 LBS | BODY MASS INDEX: 38.71 KG/M2 | HEIGHT: 59 IN

## 2022-12-23 PROCEDURE — 85018 HEMOGLOBIN: CPT | Mod: QW

## 2022-12-23 PROCEDURE — 94727 GAS DIL/WSHOT DETER LNG VOL: CPT

## 2022-12-23 PROCEDURE — 94010 BREATHING CAPACITY TEST: CPT

## 2022-12-23 PROCEDURE — 94729 DIFFUSING CAPACITY: CPT

## 2023-01-16 ENCOUNTER — APPOINTMENT (OUTPATIENT)
Dept: PULMONOLOGY | Facility: CLINIC | Age: 48
End: 2023-01-16

## 2023-01-20 ENCOUNTER — APPOINTMENT (OUTPATIENT)
Dept: CARDIOLOGY | Facility: CLINIC | Age: 48
End: 2023-01-20
Payer: COMMERCIAL

## 2023-01-20 VITALS
RESPIRATION RATE: 12 BRPM | WEIGHT: 194 LBS | HEIGHT: 59 IN | SYSTOLIC BLOOD PRESSURE: 120 MMHG | DIASTOLIC BLOOD PRESSURE: 64 MMHG | HEART RATE: 79 BPM | BODY MASS INDEX: 39.11 KG/M2

## 2023-01-20 DIAGNOSIS — R07.89 OTHER CHEST PAIN: ICD-10-CM

## 2023-01-20 DIAGNOSIS — K21.9 GASTRO-ESOPHAGEAL REFLUX DISEASE W/OUT ESOPHAGITIS: ICD-10-CM

## 2023-01-20 PROCEDURE — 93000 ELECTROCARDIOGRAM COMPLETE: CPT

## 2023-01-20 PROCEDURE — 99214 OFFICE O/P EST MOD 30 MIN: CPT | Mod: 25

## 2023-01-20 RX ORDER — LEVOFLOXACIN 500 MG/1
500 TABLET, FILM COATED ORAL
Qty: 10 | Refills: 0 | Status: DISCONTINUED | COMMUNITY
Start: 2022-07-26 | End: 2023-01-20

## 2023-01-20 RX ORDER — METHYLPREDNISOLONE 4 MG/1
4 TABLET ORAL
Qty: 21 | Refills: 0 | Status: DISCONTINUED | COMMUNITY
Start: 2022-07-26 | End: 2023-01-20

## 2023-01-20 NOTE — ASSESSMENT
[FreeTextEntry1] : 1.  EKG today reveals sinus rhythm at 79 bpm.  One PVC.  Normal intervals. No evidence of ischemia. \par \par 2.  Hyperlipidemia:  Patient without recent bloodwork for assessment.  I have advised her to continue current medications as well as follow a strict low-fat / low-cholesterol diet.  Will undergo fasting bloodwork prior to her next office visit.  \par \par 3.  Non-obstructive coronary artery disease associated with coronary artery spasm.  Patient clinically stable and well controlled with combination Ranolazine, Isosorbide and Diltiazem.  \par \par 4.  Multiple GI complaints:  Patient will undergo combination endoscopy/colonoscopy on January 25, 2023.  She may discontinue Clopidogrel 5-7 days prior to her procedures.  Patient should also stop multivitamins.  May continue all other medications up to and including the morning of her procedures.  \par \par 5.  Lower back pain:  Patient apparently not a candidate for epidural pain management therapy and has been advised to possibly attempt a trial of non-steroidal anti-inflammatory medication.  Given her history of coronary artery spasm, I do not believe that non-steroidal anti-inflammatory therapy is a good idea as it would create adipose vasoconstriction secondary non-steroidal anti-inflammatory induced prostaglandin inhibition.  I have asked her to discuss this further with her PCP and pain management specialist.  \par \par 6.  If clinically stable, patient is advised to continue with current medications.  May switch Clopidogrel to low-dose aspirin if GI feels that this will help her reflux and gastroesophageal irritation.  Patient to undergo follow up echocardiography prior to her next office visit in three months.   \par  \par

## 2023-01-20 NOTE — REASON FOR VISIT
[FreeTextEntry1] : Mrs. Wright is a pleasant 47-year-old obese white female with a past medical history significant for hyperlipidemia, diabetes mellitus, asthma/prior tobacco dependance, and non-obstructive coronary artery disease with associated coronary artery spasm, who presents for follow up evaluation.  \par \par

## 2023-01-20 NOTE — HISTORY OF PRESENT ILLNESS
[FreeTextEntry1] : From a cardiac standpoint, Mrs. Wright presents today with complaints of retrosternal chest discomfort described as a burning-like sensation which she believes is secondary to GERD.  Patient attributes this to her excessive weight.  Also complains of palpitations and is scheduled to undergo both upper endoscopy and colonoscopy in the near future. \par \par From a cardiac standpoint, she denies exertional chest pain, significant shortness of breath, or palpitations.  Appears to be tolerating all of her medications well.  \par

## 2023-02-08 ENCOUNTER — APPOINTMENT (OUTPATIENT)
Dept: CARDIOLOGY | Facility: CLINIC | Age: 48
End: 2023-02-08

## 2023-02-23 ENCOUNTER — APPOINTMENT (OUTPATIENT)
Dept: PULMONOLOGY | Facility: CLINIC | Age: 48
End: 2023-02-23
Payer: COMMERCIAL

## 2023-02-23 VITALS
SYSTOLIC BLOOD PRESSURE: 120 MMHG | HEART RATE: 86 BPM | WEIGHT: 196 LBS | DIASTOLIC BLOOD PRESSURE: 60 MMHG | OXYGEN SATURATION: 95 % | BODY MASS INDEX: 39.59 KG/M2 | RESPIRATION RATE: 16 BRPM

## 2023-02-23 DIAGNOSIS — R05.9 COUGH, UNSPECIFIED: ICD-10-CM

## 2023-02-23 PROCEDURE — 99214 OFFICE O/P EST MOD 30 MIN: CPT

## 2023-02-23 RX ORDER — PANTOPRAZOLE SODIUM 20 MG/1
TABLET, DELAYED RELEASE ORAL
Refills: 0 | Status: ACTIVE | COMMUNITY

## 2023-02-23 RX ORDER — MOMETASONE 50 UG/1
50 SPRAY, METERED NASAL
Qty: 1 | Refills: 5 | Status: ACTIVE | COMMUNITY
Start: 2023-02-23 | End: 1900-01-01

## 2023-02-23 RX ORDER — FAMOTIDINE 40 MG/1
TABLET, FILM COATED ORAL
Refills: 0 | Status: ACTIVE | COMMUNITY

## 2023-02-23 RX ORDER — METHOCARBAMOL 500 MG/1
500 TABLET, FILM COATED ORAL
Qty: 45 | Refills: 0 | Status: COMPLETED | COMMUNITY
Start: 2022-02-25 | End: 2023-02-23

## 2023-02-23 RX ORDER — SODIUM CHLORIDE/SODIUM BICARB
KIT NASAL
Qty: 1 | Refills: 0 | Status: ACTIVE | COMMUNITY
Start: 2023-02-23 | End: 1900-01-01

## 2023-02-23 NOTE — HISTORY OF PRESENT ILLNESS
[Former] : former [Mild Persistent] : mild persistent [Doing Well] : doing well [Well Controlled] : Well controlled [___ Times a Week] : of [unfilled] time(s) a week [Cold] : cold weather [Adherent] : the patient is adherent with ~his/her~ medication regimen [TextBox_11] : 1 [TextBox_13] : 22 [YearQuit] : 2017 [FreeTextEntry3] : cough at night, took off ramapril, episodic, relieved by ANGELA [de-identified] : Trelegy x 2 yrs, PND

## 2023-02-23 NOTE — DISCUSSION/SUMMARY
[Asthma] : asthma [COPD] : chronic obstructive pulmonary disease [Medication Changes Per Orders] : Medication changes are as documented in orders [Patient] : the patient [de-identified] : with sinusitis

## 2023-02-23 NOTE — CONSULT LETTER
[Dear  ___] : Dear  [unfilled], [Consult Letter:] : I had the pleasure of evaluating your patient, [unfilled]. [Please see my note below.] : Please see my note below. [Consult Closing:] : Thank you very much for allowing me to participate in the care of this patient.  If you have any questions, please do not hesitate to contact me. [Sincerely,] : Sincerely, [FreeTextEntry3] : Seth Min MD FCCP\par Pulmonary/Critical Care/Sleep Medicine\par Department of Internal Medicine\par \par [facility] [employer]\par

## 2023-03-02 ENCOUNTER — NON-APPOINTMENT (OUTPATIENT)
Age: 48
End: 2023-03-02

## 2023-03-09 ENCOUNTER — APPOINTMENT (OUTPATIENT)
Dept: CARDIOLOGY | Facility: CLINIC | Age: 48
End: 2023-03-09
Payer: COMMERCIAL

## 2023-03-09 PROCEDURE — 93306 TTE W/DOPPLER COMPLETE: CPT

## 2023-04-11 ENCOUNTER — RX RENEWAL (OUTPATIENT)
Age: 48
End: 2023-04-11

## 2023-04-21 ENCOUNTER — APPOINTMENT (OUTPATIENT)
Dept: CARDIOLOGY | Facility: CLINIC | Age: 48
End: 2023-04-21
Payer: COMMERCIAL

## 2023-04-21 VITALS
WEIGHT: 192 LBS | OXYGEN SATURATION: 97 % | DIASTOLIC BLOOD PRESSURE: 60 MMHG | BODY MASS INDEX: 38.71 KG/M2 | RESPIRATION RATE: 16 BRPM | HEIGHT: 59 IN | HEART RATE: 68 BPM | SYSTOLIC BLOOD PRESSURE: 90 MMHG

## 2023-04-21 PROCEDURE — 99214 OFFICE O/P EST MOD 30 MIN: CPT | Mod: 25

## 2023-04-21 PROCEDURE — 93000 ELECTROCARDIOGRAM COMPLETE: CPT

## 2023-04-21 RX ORDER — ATORVASTATIN CALCIUM 40 MG/1
40 TABLET, FILM COATED ORAL
Qty: 90 | Refills: 3 | Status: ACTIVE | COMMUNITY
Start: 1900-01-01 | End: 1900-01-01

## 2023-04-21 RX ORDER — DILTIAZEM HYDROCHLORIDE 180 MG/1
180 CAPSULE, EXTENDED RELEASE ORAL
Qty: 180 | Refills: 3 | Status: ACTIVE | COMMUNITY
Start: 2021-03-31 | End: 1900-01-01

## 2023-04-21 RX ORDER — FUROSEMIDE 20 MG/1
20 TABLET ORAL TWICE DAILY
Qty: 180 | Refills: 3 | Status: ACTIVE | COMMUNITY
Start: 2020-10-07 | End: 1900-01-01

## 2023-04-25 NOTE — HISTORY OF PRESENT ILLNESS
[FreeTextEntry1] : From a cardiac standpoint, Mrs. Wright denies exertional chest pain, shortness of breath, or other symptoms.  She remains reasonably active. \par \par

## 2023-04-25 NOTE — ASSESSMENT
[FreeTextEntry1] : 1. EKG today reveals normal sinus rhythm at 68 bpm.  Normal intervals.  Non-specific ST-T changes. \par \par 2. Obstructive coronary artery disease with “spasm:”  Clinically stable without significant symptoms at this point.  Patient has had no further chest pain since the implementation of high-dose Ranolazine.  Currently also on isosorbide mononitrate at 60 mg q.d.  I have asked her to begin a wean off of the nitrates.  She will reduce her isosorbide to 30 for two weeks and then discontinue.  \par \par 3. Review of recent bloodwork demonstrates a total cholesterol of 185, HDL 56, TC/HDL ratio 3.3., LDL 96, triglycerides 164.  Patient advised to continue current statin therapy and follow a stricter low-fat / low-cholesterol diet.  Will undergo fasting bloodwork prior to her next office visit. \par \par 4. Recent echocardiography revealed normal left ventricular chamber dimensions and wall motion with preserved ejection fraction estimated between 60 and 65%.  The left atrium and right-sided chambers revealed normal dimensions and function.  No other significant findings.  \par   \par

## 2023-04-25 NOTE — REASON FOR VISIT
[FreeTextEntry1] : Mrs. Wright is a pleasant 48-year-old obese white female with a past medical history significant for hyperlipidemia, diabetes mellitus, asthma/prior tobacco dependance, and non-obstructive coronary artery disease with associated coronary artery spasm, who presents for follow up evaluation.  \par \par

## 2023-05-19 RX ORDER — FLUTICASONE FUROATE, UMECLIDINIUM BROMIDE AND VILANTEROL TRIFENATATE 100; 62.5; 25 UG/1; UG/1; UG/1
100-62.5-25 POWDER RESPIRATORY (INHALATION) DAILY
Qty: 3 | Refills: 3 | Status: ACTIVE | COMMUNITY
Start: 1900-01-01 | End: 1900-01-01

## 2023-06-01 RX ORDER — RANOLAZINE 1000 MG/1
1000 TABLET, EXTENDED RELEASE ORAL
Qty: 180 | Refills: 3 | Status: ACTIVE | COMMUNITY
Start: 2023-04-11 | End: 1900-01-01

## 2023-08-14 ENCOUNTER — APPOINTMENT (OUTPATIENT)
Dept: PULMONOLOGY | Facility: CLINIC | Age: 48
End: 2023-08-14

## 2023-08-28 ENCOUNTER — NON-APPOINTMENT (OUTPATIENT)
Age: 48
End: 2023-08-28

## 2023-08-28 ENCOUNTER — APPOINTMENT (OUTPATIENT)
Dept: CARDIOLOGY | Facility: CLINIC | Age: 48
End: 2023-08-28
Payer: COMMERCIAL

## 2023-08-28 VITALS
DIASTOLIC BLOOD PRESSURE: 64 MMHG | HEART RATE: 62 BPM | RESPIRATION RATE: 16 BRPM | WEIGHT: 197 LBS | HEIGHT: 60 IN | BODY MASS INDEX: 38.68 KG/M2 | SYSTOLIC BLOOD PRESSURE: 110 MMHG

## 2023-08-28 PROCEDURE — 99214 OFFICE O/P EST MOD 30 MIN: CPT | Mod: 25

## 2023-08-28 PROCEDURE — 93000 ELECTROCARDIOGRAM COMPLETE: CPT

## 2023-08-28 NOTE — HISTORY OF PRESENT ILLNESS
[FreeTextEntry1] : Mrs. Wright presents today stating that since her last office visit she has had 2 episodes of "spasms", feeling the discomfort in her upper back and jaw.  The first episode occurred after discontinuing the isosorbide mononitrate and the second was approximately three weeks ago.  She had restarted the isosorbide one month prior to the spasm.  She has also been feeling short of breath and more fatigued than usual.  Denies palpitations, lightheadedness or syncope.

## 2023-08-28 NOTE — CARDIOLOGY SUMMARY
[de-identified] : Sinus rhythm at 62 bpm.  Low voltage in precordial leads.  Non-specific ST-T changes.

## 2023-08-28 NOTE — REVIEW OF SYSTEMS
[Weight Gain (___ Lbs)] : [unfilled] ~Ulb weight gain [Feeling Fatigued] : feeling fatigued [Negative] : Heme/Lymph [FreeTextEntry5] : See HPI

## 2023-08-28 NOTE — REASON FOR VISIT
[FreeTextEntry1] : Mrs. Wright is a pleasant 48-year-old obese white female with a past medical history significant for hyperlipidemia, diabetes mellitus, asthma/prior tobacco dependance, and non-obstructive coronary artery disease with associated coronary artery spasm, who presents for follow up evaluation.

## 2023-08-28 NOTE — DISCUSSION/SUMMARY
[FreeTextEntry1] : 1 - Hypertension:  blood pressure well controlled on current medications.  Advised to follow low sodium diet and weight loss.  2 - Obstructive coronary artery disease with "spasm':  presents today stating that since her last office visit she has had 2 episodes of "spasms", feeling the discomfort in her upper back and jaw.  The first episode occurred after discontinuing the isosorbide mononitrate and the second was approximately three weeks ago.  She had restarted the isosorbide one month prior to the spasm.  She has also been feeling short of breath and more fatigued than usual.  Denies palpitations, lightheadedness or syncope.  Will schedule for a 2-day pharmacologic nuclear stress test as she has limited mobility due to her knee.    3 - Hyperlipidemia:  cholesterol 177, LDL 84.6, HDL 66, triglycerides 132.  Needs target LDL <70.  Would like to work on diet and increase physical activity/exercise prior to increasing her atorvastatin dose.  Currently taking Atorvastatin 40mg daily.  Follow low fat, low cholesterol diet.    4 - Hypovitaminosis D:  recent level 25.26.  Will start vitamin D3 5000 units daily.  5 - Obesity:  has gained 5 pounds from her prior visit.  Admits that she has not been very compliant with her diet.  Follow low fat, low caloric diet and work on weight loss.  6 - Recent labs (7/18/2023):  H/H 14.6/43.7, platelets 225, TSH 1.45, glucose 128, HgA1c 6.1 (followed by PCP), potassium 4.4, BUN 13, creatinine 0.8.  7 - Follow up after testing. [EKG obtained to assist in diagnosis and management of assessed problem(s)] : EKG obtained to assist in diagnosis and management of assessed problem(s)

## 2023-08-28 NOTE — PHYSICAL EXAM
[Well Developed] : well developed [No Acute Distress] : no acute distress [Obese] : obese [Normal Conjunctiva] : normal conjunctiva [No Carotid Bruit] : no carotid bruit [Normal Venous Pressure] : normal venous pressure [Normal S1, S2] : normal S1, S2 [No Murmur] : no murmur [No Rub] : no rub [No Gallop] : no gallop [Clear Lung Fields] : clear lung fields [No Respiratory Distress] : no respiratory distress  [Normal Bowel Sounds] : normal bowel sounds [Normal Gait] : normal gait [No Edema] : no edema [No Rash] : no rash [Moves all extremities] : moves all extremities [No Focal Deficits] : no focal deficits [Normal Speech] : normal speech [Alert and Oriented] : alert and oriented [Normal memory] : normal memory

## 2023-09-27 ENCOUNTER — APPOINTMENT (OUTPATIENT)
Dept: CARDIOLOGY | Facility: CLINIC | Age: 48
End: 2023-09-27

## 2023-10-04 ENCOUNTER — APPOINTMENT (OUTPATIENT)
Dept: CARDIOLOGY | Facility: CLINIC | Age: 48
End: 2023-10-04
Payer: COMMERCIAL

## 2023-10-04 ENCOUNTER — APPOINTMENT (OUTPATIENT)
Dept: CARDIOLOGY | Facility: CLINIC | Age: 48
End: 2023-10-04

## 2023-10-04 PROCEDURE — 93015 CV STRESS TEST SUPVJ I&R: CPT

## 2023-10-11 ENCOUNTER — APPOINTMENT (OUTPATIENT)
Dept: CARDIOLOGY | Facility: CLINIC | Age: 48
End: 2023-10-11

## 2023-11-08 ENCOUNTER — APPOINTMENT (OUTPATIENT)
Dept: CARDIOLOGY | Facility: CLINIC | Age: 48
End: 2023-11-08
Payer: COMMERCIAL

## 2023-11-08 PROCEDURE — 93015 CV STRESS TEST SUPVJ I&R: CPT

## 2023-11-08 PROCEDURE — A9500: CPT

## 2023-11-08 PROCEDURE — 78452 HT MUSCLE IMAGE SPECT MULT: CPT

## 2023-11-09 ENCOUNTER — APPOINTMENT (OUTPATIENT)
Dept: CARDIOLOGY | Facility: CLINIC | Age: 48
End: 2023-11-09

## 2023-11-10 NOTE — REASON FOR VISIT
No
[FreeTextEntry1] : The patient is a pleasant 46-year-old white female with a past medical history significant for hyperlipidemia, diabetes mellitus, tobacco dependence, asthma, non-obstructive coronary artery disease with associated coronary artery spasm, who presents for follow up evaluation.\par \par Unfortunately, Mrs. Wright has been experiencing substernal chest discomfort over the last couple days.  She describes this as a "burning sensation" that does not radiate, there are no obvious associated symptoms and it is resolved with taking sublingual nitroglycerine within minutes of taking this med.\par \par She has history of GERD and takes Pantoprazole 40 mg daily, she took two tabs of the PPI yesterday (80 mg) with no relief.  She subsequently took the sublingual nitro and symptoms completely resolved immediately.\par \par Otherwise, patient denies associated symptoms such as diaphoresis, SOB, TYLER, palpitations, presyncope, syncope.

## 2023-11-15 ENCOUNTER — NON-APPOINTMENT (OUTPATIENT)
Age: 48
End: 2023-11-15

## 2023-11-15 ENCOUNTER — APPOINTMENT (OUTPATIENT)
Dept: CARDIOLOGY | Facility: CLINIC | Age: 48
End: 2023-11-15
Payer: COMMERCIAL

## 2023-11-15 VITALS
DIASTOLIC BLOOD PRESSURE: 72 MMHG | HEART RATE: 67 BPM | BODY MASS INDEX: 38.68 KG/M2 | SYSTOLIC BLOOD PRESSURE: 110 MMHG | WEIGHT: 197 LBS | RESPIRATION RATE: 16 BRPM | HEIGHT: 60 IN

## 2023-11-15 DIAGNOSIS — E55.9 VITAMIN D DEFICIENCY, UNSPECIFIED: ICD-10-CM

## 2023-11-15 PROCEDURE — 93000 ELECTROCARDIOGRAM COMPLETE: CPT

## 2023-11-15 PROCEDURE — 99214 OFFICE O/P EST MOD 30 MIN: CPT | Mod: 25

## 2023-11-15 RX ORDER — ISOSORBIDE MONONITRATE 60 MG/1
60 TABLET, EXTENDED RELEASE ORAL
Qty: 90 | Refills: 3 | Status: ACTIVE | COMMUNITY
Start: 2021-07-07 | End: 1900-01-01

## 2023-11-15 RX ORDER — LOSARTAN POTASSIUM 50 MG/1
50 TABLET, FILM COATED ORAL
Qty: 90 | Refills: 1 | Status: ACTIVE | COMMUNITY
Start: 1900-01-01 | End: 1900-01-01

## 2023-12-15 NOTE — H&P PST ADULT - FEMALE-SPECIFIC SYMPTOMS
A collaboration between [facility] and [facility]  Experts in minimally invasive, targeted treatments performed using imaging guidance    Venous Access Device, Port Catheter or Tunneled Central Line Removal    Today you had your existing venous access device removed, either because it was no longer needed or because there was malfunction or infection issues.    After you go home:  Drink plenty of fluids.  Generally 6-8 (8 ounce) glasses a day is recommended.  Resume your regular diet unless otherwise ordered by a medical provider.  Keep any applied tape/gauze dressings clean and dry.  Change tape/gauze dressings if they get wet or soiled.  You may shower the following day after procedure, however cover and protect from moisture any tape/gauze dressings.  You may let water hit and run over dried skin glue, but do not scrub.  Pat the area dry after showering.  Port removal incisions are closed with absorbable suture, meaning they do not need to be removed at a later date, and a topical skin adhesive (skin glue).  This glue will wear off in 7-14 days.  Do not remove before this time.  If 14 days have passed and residual glue is present, you may gently remove it.  You may remove tape/gauze dressings after 5 days if the site looks closed and in the process of healing.  Do not apply gels, lotions, or ointments to the glue site for the first 10 days as this may cause the glue to prematurely soften and fail.  Do not perform strenuous activities or lift greater than 10 pounds for the next three days.  If there is bleeding or oozing from the procedure site, apply firm pressure to the area for 5-10 minutes.  If the bleeding continues seek medical advice at the numbers below.  Mild procedure site discomfort can be treated with an ice pack and over-the-counter pain relievers.              For 24 hours after any sedation used:  Relax and take it easy.  No strenuous  activities.  Do not drive or operate machines at home or at work.  No alcohol consumption.  Do not make any important or legal decisions.    Call our Interventional Radiology (IR) service if:  If you start bleeding from the procedure site.  If you do start to bleed from the site, lie down and hold some pressure on the site.  Our radiology provider can help you decide if you need to return to the hospital.  If you have new or worsening pain related to the procedure.  If you have concerning swelling at the procedure site.  If you develop persistent nausea or vomiting.  If you develop hives or a rash or any unexplained itching.  If you have a fever of greater than 100.5  F and chills in the first 5 days after procedure.  Any other concerns related to your procedure.      [facility]  Interventional Radiology (IR)  [address]  [address] [facility] [place] [address]    Contact Number:  [phone]  (IR Nurse Triage)  Monday - Friday 7am - 4pm    After hours for urgent concerns:  [phone]  After 4pm Monday - Friday, Weekends and Holidays.   Ask for Interventional Radiology on-call.  Someone is available 24 hours a day.  [facility] toll free number:  [phone]            alvarez menopausal/irregular menses

## 2024-01-23 ENCOUNTER — APPOINTMENT (OUTPATIENT)
Dept: CARDIOLOGY | Facility: CLINIC | Age: 49
End: 2024-01-23
Payer: COMMERCIAL

## 2024-01-23 VITALS
RESPIRATION RATE: 16 BRPM | WEIGHT: 195 LBS | HEIGHT: 60 IN | SYSTOLIC BLOOD PRESSURE: 130 MMHG | DIASTOLIC BLOOD PRESSURE: 80 MMHG | BODY MASS INDEX: 38.28 KG/M2 | HEART RATE: 95 BPM

## 2024-01-23 DIAGNOSIS — R00.2 PALPITATIONS: ICD-10-CM

## 2024-01-23 DIAGNOSIS — E66.9 OBESITY, UNSPECIFIED: ICD-10-CM

## 2024-01-23 PROCEDURE — 99214 OFFICE O/P EST MOD 30 MIN: CPT | Mod: 25

## 2024-01-23 PROCEDURE — 93000 ELECTROCARDIOGRAM COMPLETE: CPT

## 2024-01-23 RX ORDER — VITAMIN K2 90 MCG
125 MCG CAPSULE ORAL
Qty: 90 | Refills: 3 | Status: ACTIVE | COMMUNITY
Start: 2023-08-28 | End: 1900-01-01

## 2024-01-24 NOTE — HISTORY OF PRESENT ILLNESS
[FreeTextEntry1] : Mrs. Wright was recently seen in the office for complaints of palpitations. She states these have improved as she has destressed herself following the holidays. She underwent 30-day ambulatory event monitoring and fasting bloodwork as part of her assessment.

## 2024-01-24 NOTE — ASSESSMENT
[FreeTextEntry1] : 1. EKG today demonstrates sinus rhythm at 95 bpm. Short MT interval. Non-specific ST-T changes.   2. Hyperlipidemia: Recent fasting lipid profile revealed a total cholesterol of 175, HDL 55, TC/HDL ratio 3.2, LDL 82, triglycerides 191. In addition, fasting glucose 129, hemoglobin A1c 6.1. Patient advised to continue current medications and follow a stricter low-fat / low-cholesterol diet. May require either augmentation of her Atorvastatin to 80 mg daily or the addition of Zetia 10 mg daily should she not reach LDL target of 70 or lower.   3. Diabetes mellitus: Clinically stable with prediabetic numbers at this time. Advised on low-carb diet and follow up with PCP. Regular aerobic exercise should also help here. Patient has managed to lose five pounds since her last visit.   4. Non-obstructive coronary artery disease/coronary artery spasm: Clinically stable without significant symptoms at this time. Recent nuclear stress test negative for ischemia. Patient advised to continue all current medications, follow a low-fat / low-cholesterol and low-carbohydrate diet as well as exercise as much as possible in order to lose weight. If clinically stable, office visit six months.     5. Palpitations: Patient without palpitations at this time. Has chalked it all up to holiday-related stress. Her 30-day ambulatory event monitor was unremarkable demonstrating sinus rhythm and less than 1% PACs and PVCs. No high-grade arrhythmias noted. Patient advised to avoid caffeine and hydrate well.

## 2024-01-24 NOTE — PHYSICAL EXAM
[Well Developed] : well developed [Obese] : obese [No Acute Distress] : no acute distress [Normal Conjunctiva] : normal conjunctiva [Normal Venous Pressure] : normal venous pressure [No Carotid Bruit] : no carotid bruit [Normal S1, S2] : normal S1, S2 [No Murmur] : no murmur [No Rub] : no rub [Clear Lung Fields] : clear lung fields [No Gallop] : no gallop [No Respiratory Distress] : no respiratory distress  [Normal Bowel Sounds] : normal bowel sounds [Normal Gait] : normal gait [Moves all extremities] : moves all extremities [No Rash] : no rash [No Focal Deficits] : no focal deficits [Normal Speech] : normal speech [Alert and Oriented] : alert and oriented [Normal memory] : normal memory [de-identified] : Trace bilateral LE edema

## 2024-01-29 RX ORDER — KIT FOR THE PREPARATION OF TECHNETIUM TC99M SESTAMIBI 1 MG/5ML
INJECTION, POWDER, LYOPHILIZED, FOR SOLUTION PARENTERAL
Refills: 0 | Status: COMPLETED | OUTPATIENT
Start: 2024-01-29

## 2024-01-29 RX ADMIN — KIT FOR THE PREPARATION OF TECHNETIUM TC99M SESTAMIBI 0: 1 INJECTION, POWDER, LYOPHILIZED, FOR SOLUTION PARENTERAL at 00:00

## 2024-05-09 RX ORDER — CLOPIDOGREL BISULFATE 75 MG/1
75 TABLET, FILM COATED ORAL
Qty: 90 | Refills: 3 | Status: ACTIVE | COMMUNITY
Start: 2019-04-12 | End: 1900-01-01

## 2024-05-16 RX ORDER — NITROGLYCERIN 0.4 MG/1
0.4 TABLET SUBLINGUAL
Qty: 3 | Refills: 3 | Status: ACTIVE | COMMUNITY
Start: 2019-05-07 | End: 1900-01-01

## 2024-07-02 ENCOUNTER — APPOINTMENT (OUTPATIENT)
Dept: CARDIOLOGY | Facility: CLINIC | Age: 49
End: 2024-07-02
Payer: COMMERCIAL

## 2024-07-02 VITALS
BODY MASS INDEX: 38.48 KG/M2 | WEIGHT: 196 LBS | RESPIRATION RATE: 16 BRPM | HEIGHT: 60 IN | HEART RATE: 65 BPM | SYSTOLIC BLOOD PRESSURE: 122 MMHG | DIASTOLIC BLOOD PRESSURE: 68 MMHG

## 2024-07-02 DIAGNOSIS — J45.30 MILD PERSISTENT ASTHMA, UNCOMPLICATED: ICD-10-CM

## 2024-07-02 DIAGNOSIS — E78.00 PURE HYPERCHOLESTEROLEMIA, UNSPECIFIED: ICD-10-CM

## 2024-07-02 DIAGNOSIS — E11.9 TYPE 2 DIABETES MELLITUS W/OUT COMPLICATIONS: ICD-10-CM

## 2024-07-02 DIAGNOSIS — R07.9 CHEST PAIN, UNSPECIFIED: ICD-10-CM

## 2024-07-02 DIAGNOSIS — I25.10 ATHEROSCLEROTIC HEART DISEASE OF NATIVE CORONARY ARTERY W/OUT ANGINA PECTORIS: ICD-10-CM

## 2024-07-02 DIAGNOSIS — R60.0 LOCALIZED EDEMA: ICD-10-CM

## 2024-07-02 DIAGNOSIS — I20.1 ANGINA PECTORIS WITH DOCUMENTED SPASM: ICD-10-CM

## 2024-07-02 PROCEDURE — 93000 ELECTROCARDIOGRAM COMPLETE: CPT

## 2024-07-02 PROCEDURE — 99214 OFFICE O/P EST MOD 30 MIN: CPT

## 2024-07-02 PROCEDURE — G2211 COMPLEX E/M VISIT ADD ON: CPT

## 2024-07-02 RX ORDER — POTASSIUM CHLORIDE 750 MG/1
10 TABLET, EXTENDED RELEASE ORAL
Qty: 180 | Refills: 3 | Status: ACTIVE | COMMUNITY
Start: 2024-07-02 | End: 1900-01-01

## 2024-07-20 ENCOUNTER — NON-APPOINTMENT (OUTPATIENT)
Age: 49
End: 2024-07-20

## 2024-08-15 ENCOUNTER — APPOINTMENT (OUTPATIENT)
Dept: DERMATOLOGY | Facility: CLINIC | Age: 49
End: 2024-08-15
Payer: COMMERCIAL

## 2024-08-15 PROCEDURE — 99203 OFFICE O/P NEW LOW 30 MIN: CPT

## 2024-09-09 NOTE — ED ADULT NURSE REASSESSMENT NOTE - ABDOMEN
Reason for call:   [x] Refill   [] Prior Auth  [] Other:     Office:   [] PCP/Provider -   [x] Specialty/Provider - [facility]     Medication: phentermine 30 MG capsule     Dose/Frequency:  Take 1 capsule (30 mg total) by mouth every morning,     Quantity: 30    Pharmacy:  [id number] - [place] [address]     Does the patient have enough for 3 days?   [x] Yes   [] No - Send as HP to POD     soft/nondistended

## 2024-09-25 ENCOUNTER — APPOINTMENT (OUTPATIENT)
Dept: CARDIOLOGY | Facility: CLINIC | Age: 49
End: 2024-09-25
Payer: COMMERCIAL

## 2024-09-25 VITALS
WEIGHT: 205 LBS | SYSTOLIC BLOOD PRESSURE: 115 MMHG | DIASTOLIC BLOOD PRESSURE: 70 MMHG | BODY MASS INDEX: 40.25 KG/M2 | OXYGEN SATURATION: 97 % | HEIGHT: 60 IN | HEART RATE: 73 BPM

## 2024-09-25 DIAGNOSIS — I25.10 ATHEROSCLEROTIC HEART DISEASE OF NATIVE CORONARY ARTERY W/OUT ANGINA PECTORIS: ICD-10-CM

## 2024-09-25 DIAGNOSIS — I20.1 ANGINA PECTORIS WITH DOCUMENTED SPASM: ICD-10-CM

## 2024-09-25 DIAGNOSIS — E78.00 PURE HYPERCHOLESTEROLEMIA, UNSPECIFIED: ICD-10-CM

## 2024-09-25 DIAGNOSIS — E11.9 TYPE 2 DIABETES MELLITUS W/OUT COMPLICATIONS: ICD-10-CM

## 2024-09-25 DIAGNOSIS — Z01.818 ENCOUNTER FOR OTHER PREPROCEDURAL EXAMINATION: ICD-10-CM

## 2024-09-25 DIAGNOSIS — E66.9 OBESITY, UNSPECIFIED: ICD-10-CM

## 2024-09-25 LAB — HBA1C MFR BLD HPLC: 6.1

## 2024-09-25 PROCEDURE — G2211 COMPLEX E/M VISIT ADD ON: CPT

## 2024-09-25 PROCEDURE — 93000 ELECTROCARDIOGRAM COMPLETE: CPT

## 2024-09-25 PROCEDURE — 99214 OFFICE O/P EST MOD 30 MIN: CPT

## 2024-09-26 NOTE — ASSESSMENT
[FreeTextEntry1] : 1. EKG today reveals normal sinus rhythm at 73 bpm. Normal intervals. T wave inversions in Leads V1-V4. These are chronic changes for this particular patient.   2. Hyperlipidemia: Recent blood work is not available, however, the patient states that her lipid profile was "pretty good." A low-fat / low-cholesterol diet and continuation of current medications was recommended.   3. Nonobstructive coronary artery disease / coronary artery spasm: The patient is clinically stable without chest pain. She is on combination Cardizem, Ranolazine, and long-acting nitrates.   4. EKG today is stable with chronic precordial T wave inversions.   5. Carpal tunnel syndrome: There are no absolute cardiac contraindications to proposed carpal tunnel surgery under anesthesia. The patient has discontinued Clopidogrel two days ago and will be off of it for approximately four and a half days prior to her procedure. This should be reinstated by orthopedics at their discretion. The patient should take all of her other medications up to and including the morning of surgery unless otherwise advised. If clinically stable from a cardiac standpoint, we will reevaluate in four months.

## 2024-09-26 NOTE — HISTORY OF PRESENT ILLNESS
[FreeTextEntry1] : From a cardiac standpoint, Mrs. Wright continues to do well denying chest pain, shortness of breath, or other cardiac symptoms. She has developed considerable left arm pain and has been diagnosed with carpal tunnel syndrome which will require surgery currently scheduled in two days.

## 2024-09-26 NOTE — REASON FOR VISIT
[FreeTextEntry1] : Mrs. Wright is a pleasant 49-year-old obese white female with a past medical history significant for hyperlipidemia, diabetes mellitus, asthma/prior tobacco dependance, and non-obstructive coronary artery disease with associated coronary artery spasm, who presents for follow up evaluation.

## 2024-09-26 NOTE — PHYSICAL EXAM
[Well Developed] : well developed [No Acute Distress] : no acute distress [Obese] : obese [Normal Conjunctiva] : normal conjunctiva [Normal Venous Pressure] : normal venous pressure [No Carotid Bruit] : no carotid bruit [Normal S1, S2] : normal S1, S2 [No Murmur] : no murmur [No Rub] : no rub [No Gallop] : no gallop [Clear Lung Fields] : clear lung fields [No Respiratory Distress] : no respiratory distress  [Normal Bowel Sounds] : normal bowel sounds [Normal Gait] : normal gait [No Rash] : no rash [Moves all extremities] : moves all extremities [No Focal Deficits] : no focal deficits [Normal Speech] : normal speech [Alert and Oriented] : alert and oriented [Normal memory] : normal memory [de-identified] : Trace bilateral LE edema

## 2024-09-26 NOTE — PHYSICAL EXAM
[Well Developed] : well developed [No Acute Distress] : no acute distress [Obese] : obese [Normal Conjunctiva] : normal conjunctiva [Normal Venous Pressure] : normal venous pressure [No Carotid Bruit] : no carotid bruit [Normal S1, S2] : normal S1, S2 [No Murmur] : no murmur [No Rub] : no rub [No Gallop] : no gallop [Clear Lung Fields] : clear lung fields [No Respiratory Distress] : no respiratory distress  [Normal Bowel Sounds] : normal bowel sounds [Normal Gait] : normal gait [No Rash] : no rash [Moves all extremities] : moves all extremities [No Focal Deficits] : no focal deficits [Normal Speech] : normal speech [Alert and Oriented] : alert and oriented [Normal memory] : normal memory [de-identified] : Trace bilateral LE edema

## 2024-09-30 ENCOUNTER — NON-APPOINTMENT (OUTPATIENT)
Age: 49
End: 2024-09-30

## 2024-11-18 ENCOUNTER — RX RENEWAL (OUTPATIENT)
Age: 49
End: 2024-11-18

## 2024-12-02 ENCOUNTER — APPOINTMENT (OUTPATIENT)
Dept: CARDIOLOGY | Facility: CLINIC | Age: 49
End: 2024-12-02

## 2024-12-13 ENCOUNTER — APPOINTMENT (OUTPATIENT)
Dept: CARDIOLOGY | Facility: CLINIC | Age: 49
End: 2024-12-13
Payer: COMMERCIAL

## 2024-12-13 VITALS
BODY MASS INDEX: 40.05 KG/M2 | RESPIRATION RATE: 16 BRPM | HEIGHT: 60 IN | DIASTOLIC BLOOD PRESSURE: 62 MMHG | WEIGHT: 204 LBS | HEART RATE: 78 BPM | SYSTOLIC BLOOD PRESSURE: 110 MMHG

## 2024-12-13 DIAGNOSIS — E11.9 TYPE 2 DIABETES MELLITUS W/OUT COMPLICATIONS: ICD-10-CM

## 2024-12-13 DIAGNOSIS — I20.1 ANGINA PECTORIS WITH DOCUMENTED SPASM: ICD-10-CM

## 2024-12-13 DIAGNOSIS — Z01.818 ENCOUNTER FOR OTHER PREPROCEDURAL EXAMINATION: ICD-10-CM

## 2024-12-13 DIAGNOSIS — I25.10 ATHEROSCLEROTIC HEART DISEASE OF NATIVE CORONARY ARTERY W/OUT ANGINA PECTORIS: ICD-10-CM

## 2024-12-13 DIAGNOSIS — E78.00 PURE HYPERCHOLESTEROLEMIA, UNSPECIFIED: ICD-10-CM

## 2024-12-13 PROCEDURE — 93000 ELECTROCARDIOGRAM COMPLETE: CPT

## 2024-12-13 PROCEDURE — G2211 COMPLEX E/M VISIT ADD ON: CPT

## 2024-12-13 PROCEDURE — 99214 OFFICE O/P EST MOD 30 MIN: CPT

## 2025-01-27 ENCOUNTER — APPOINTMENT (OUTPATIENT)
Dept: DERMATOLOGY | Facility: CLINIC | Age: 50
End: 2025-01-27

## 2025-05-23 ENCOUNTER — APPOINTMENT (OUTPATIENT)
Dept: CARDIOLOGY | Facility: CLINIC | Age: 50
End: 2025-05-23

## 2025-05-23 VITALS
SYSTOLIC BLOOD PRESSURE: 110 MMHG | BODY MASS INDEX: 37.69 KG/M2 | RESPIRATION RATE: 16 BRPM | WEIGHT: 192 LBS | HEART RATE: 70 BPM | DIASTOLIC BLOOD PRESSURE: 60 MMHG | HEIGHT: 60 IN

## 2025-05-23 DIAGNOSIS — Z74.09 OTHER REDUCED MOBILITY: ICD-10-CM

## 2025-05-23 PROCEDURE — 93000 ELECTROCARDIOGRAM COMPLETE: CPT

## 2025-05-23 PROCEDURE — G2211 COMPLEX E/M VISIT ADD ON: CPT

## 2025-05-23 PROCEDURE — 99214 OFFICE O/P EST MOD 30 MIN: CPT

## 2025-06-06 ENCOUNTER — APPOINTMENT (OUTPATIENT)
Dept: CARDIOLOGY | Facility: CLINIC | Age: 50
End: 2025-06-06

## 2025-06-06 PROCEDURE — 93306 TTE W/DOPPLER COMPLETE: CPT

## 2025-07-18 ENCOUNTER — APPOINTMENT (OUTPATIENT)
Dept: CARDIOLOGY | Facility: CLINIC | Age: 50
End: 2025-07-18

## 2025-07-21 ENCOUNTER — OUTPATIENT (OUTPATIENT)
Dept: OUTPATIENT SERVICES | Facility: HOSPITAL | Age: 50
LOS: 1 days | End: 2025-07-21
Payer: MEDICAID

## 2025-07-21 ENCOUNTER — APPOINTMENT (OUTPATIENT)
Dept: CT IMAGING | Facility: CLINIC | Age: 50
End: 2025-07-21
Payer: COMMERCIAL

## 2025-07-21 DIAGNOSIS — Z98.1 ARTHRODESIS STATUS: Chronic | ICD-10-CM

## 2025-07-21 DIAGNOSIS — E11.9 TYPE 2 DIABETES MELLITUS WITHOUT COMPLICATIONS: ICD-10-CM

## 2025-07-21 DIAGNOSIS — Z90.49 ACQUIRED ABSENCE OF OTHER SPECIFIED PARTS OF DIGESTIVE TRACT: Chronic | ICD-10-CM

## 2025-07-21 DIAGNOSIS — Z98.890 OTHER SPECIFIED POSTPROCEDURAL STATES: Chronic | ICD-10-CM

## 2025-07-21 DIAGNOSIS — Z98.891 HISTORY OF UTERINE SCAR FROM PREVIOUS SURGERY: Chronic | ICD-10-CM

## 2025-07-21 PROCEDURE — 75574 CT ANGIO HRT W/3D IMAGE: CPT | Mod: 26

## 2025-07-21 PROCEDURE — 75574 CT ANGIO HRT W/3D IMAGE: CPT

## 2025-07-22 ENCOUNTER — INPATIENT (INPATIENT)
Facility: HOSPITAL | Age: 50
LOS: 0 days | Discharge: HOME | DRG: 287 | End: 2025-07-23
Attending: STUDENT IN AN ORGANIZED HEALTH CARE EDUCATION/TRAINING PROGRAM | Admitting: STUDENT IN AN ORGANIZED HEALTH CARE EDUCATION/TRAINING PROGRAM
Payer: COMMERCIAL

## 2025-07-22 VITALS
HEIGHT: 58 IN | SYSTOLIC BLOOD PRESSURE: 144 MMHG | TEMPERATURE: 98 F | DIASTOLIC BLOOD PRESSURE: 85 MMHG | HEART RATE: 87 BPM | RESPIRATION RATE: 18 BRPM | OXYGEN SATURATION: 96 % | WEIGHT: 190.04 LBS

## 2025-07-22 DIAGNOSIS — Z98.891 HISTORY OF UTERINE SCAR FROM PREVIOUS SURGERY: Chronic | ICD-10-CM

## 2025-07-22 DIAGNOSIS — Z98.890 OTHER SPECIFIED POSTPROCEDURAL STATES: Chronic | ICD-10-CM

## 2025-07-22 DIAGNOSIS — Z90.49 ACQUIRED ABSENCE OF OTHER SPECIFIED PARTS OF DIGESTIVE TRACT: Chronic | ICD-10-CM

## 2025-07-22 DIAGNOSIS — Z98.1 ARTHRODESIS STATUS: Chronic | ICD-10-CM

## 2025-07-22 LAB
ALBUMIN SERPL ELPH-MCNC: 3.8 G/DL — SIGNIFICANT CHANGE UP (ref 3.3–5.2)
ALP SERPL-CCNC: 92 U/L — SIGNIFICANT CHANGE UP (ref 40–120)
ALT FLD-CCNC: 19 U/L — SIGNIFICANT CHANGE UP
ANION GAP SERPL CALC-SCNC: 15 MMOL/L — SIGNIFICANT CHANGE UP (ref 5–17)
APTT BLD: 36.3 SEC — SIGNIFICANT CHANGE UP (ref 26.1–36.8)
AST SERPL-CCNC: 14 U/L — SIGNIFICANT CHANGE UP
BASOPHILS # BLD AUTO: 0.02 K/UL — SIGNIFICANT CHANGE UP (ref 0–0.2)
BASOPHILS NFR BLD AUTO: 0.3 % — SIGNIFICANT CHANGE UP (ref 0–2)
BILIRUB SERPL-MCNC: 0.3 MG/DL — LOW (ref 0.4–2)
BUN SERPL-MCNC: 13.5 MG/DL — SIGNIFICANT CHANGE UP (ref 8–20)
CALCIUM SERPL-MCNC: 9 MG/DL — SIGNIFICANT CHANGE UP (ref 8.4–10.5)
CHLORIDE SERPL-SCNC: 100 MMOL/L — SIGNIFICANT CHANGE UP (ref 96–108)
CO2 SERPL-SCNC: 23 MMOL/L — SIGNIFICANT CHANGE UP (ref 22–29)
CREAT SERPL-MCNC: 0.88 MG/DL — SIGNIFICANT CHANGE UP (ref 0.5–1.3)
EGFR: 79 ML/MIN/1.73M2 — SIGNIFICANT CHANGE UP
EGFR: 79 ML/MIN/1.73M2 — SIGNIFICANT CHANGE UP
EOSINOPHIL # BLD AUTO: 0.12 K/UL — SIGNIFICANT CHANGE UP (ref 0–0.5)
EOSINOPHIL NFR BLD AUTO: 1.6 % — SIGNIFICANT CHANGE UP (ref 0–6)
GLUCOSE SERPL-MCNC: 138 MG/DL — HIGH (ref 70–99)
HCT VFR BLD CALC: 38.1 % — SIGNIFICANT CHANGE UP (ref 34.5–45)
HGB BLD-MCNC: 13.1 G/DL — SIGNIFICANT CHANGE UP (ref 11.5–15.5)
IMM GRANULOCYTES # BLD AUTO: 0.04 K/UL — SIGNIFICANT CHANGE UP (ref 0–0.07)
IMM GRANULOCYTES NFR BLD AUTO: 0.5 % — SIGNIFICANT CHANGE UP (ref 0–0.9)
INR BLD: 1.07 RATIO — SIGNIFICANT CHANGE UP (ref 0.85–1.16)
LYMPHOCYTES # BLD AUTO: 2.27 K/UL — SIGNIFICANT CHANGE UP (ref 1–3.3)
LYMPHOCYTES NFR BLD AUTO: 29.4 % — SIGNIFICANT CHANGE UP (ref 13–44)
MCHC RBC-ENTMCNC: 29.8 PG — SIGNIFICANT CHANGE UP (ref 27–34)
MCHC RBC-ENTMCNC: 34.4 G/DL — SIGNIFICANT CHANGE UP (ref 32–36)
MCV RBC AUTO: 86.8 FL — SIGNIFICANT CHANGE UP (ref 80–100)
MONOCYTES # BLD AUTO: 0.71 K/UL — SIGNIFICANT CHANGE UP (ref 0–0.9)
MONOCYTES NFR BLD AUTO: 9.2 % — SIGNIFICANT CHANGE UP (ref 2–14)
NEUTROPHILS # BLD AUTO: 4.55 K/UL — SIGNIFICANT CHANGE UP (ref 1.8–7.4)
NEUTROPHILS NFR BLD AUTO: 59 % — SIGNIFICANT CHANGE UP (ref 43–77)
NRBC # BLD AUTO: 0 K/UL — SIGNIFICANT CHANGE UP (ref 0–0)
NRBC # FLD: 0 K/UL — SIGNIFICANT CHANGE UP (ref 0–0)
NRBC BLD AUTO-RTO: 0 /100 WBCS — SIGNIFICANT CHANGE UP (ref 0–0)
PLATELET # BLD AUTO: 249 K/UL — SIGNIFICANT CHANGE UP (ref 150–400)
PMV BLD: 10.2 FL — SIGNIFICANT CHANGE UP (ref 7–13)
POTASSIUM SERPL-MCNC: 3.8 MMOL/L — SIGNIFICANT CHANGE UP (ref 3.5–5.3)
POTASSIUM SERPL-SCNC: 3.8 MMOL/L — SIGNIFICANT CHANGE UP (ref 3.5–5.3)
PROT SERPL-MCNC: 6.5 G/DL — LOW (ref 6.6–8.7)
PROTHROM AB SERPL-ACNC: 12.1 SEC — SIGNIFICANT CHANGE UP (ref 9.9–13.4)
RBC # BLD: 4.39 M/UL — SIGNIFICANT CHANGE UP (ref 3.8–5.2)
RBC # FLD: 11.8 % — SIGNIFICANT CHANGE UP (ref 10.3–14.5)
SODIUM SERPL-SCNC: 138 MMOL/L — SIGNIFICANT CHANGE UP (ref 135–145)
TROPONIN T, HIGH SENSITIVITY RESULT: <6 NG/L — SIGNIFICANT CHANGE UP (ref 0–51)
WBC # BLD: 7.71 K/UL — SIGNIFICANT CHANGE UP (ref 3.8–10.5)
WBC # FLD AUTO: 7.71 K/UL — SIGNIFICANT CHANGE UP (ref 3.8–10.5)

## 2025-07-22 PROCEDURE — 99285 EMERGENCY DEPT VISIT HI MDM: CPT

## 2025-07-22 PROCEDURE — 71045 X-RAY EXAM CHEST 1 VIEW: CPT | Mod: 26

## 2025-07-23 ENCOUNTER — NON-APPOINTMENT (OUTPATIENT)
Age: 50
End: 2025-07-23

## 2025-07-23 ENCOUNTER — TRANSCRIPTION ENCOUNTER (OUTPATIENT)
Age: 50
End: 2025-07-23

## 2025-07-23 VITALS
HEART RATE: 71 BPM | RESPIRATION RATE: 16 BRPM | SYSTOLIC BLOOD PRESSURE: 126 MMHG | DIASTOLIC BLOOD PRESSURE: 76 MMHG | OXYGEN SATURATION: 98 %

## 2025-07-23 DIAGNOSIS — I24.9 ACUTE ISCHEMIC HEART DISEASE, UNSPECIFIED: ICD-10-CM

## 2025-07-23 DIAGNOSIS — E78.5 HYPERLIPIDEMIA, UNSPECIFIED: ICD-10-CM

## 2025-07-23 DIAGNOSIS — I25.118 ATHEROSCLEROTIC HEART DISEASE OF NATIVE CORONARY ARTERY WITH OTHER FORMS OF ANGINA PECTORIS: ICD-10-CM

## 2025-07-23 DIAGNOSIS — I10 ESSENTIAL (PRIMARY) HYPERTENSION: ICD-10-CM

## 2025-07-23 LAB
GLUCOSE BLDC GLUCOMTR-MCNC: 161 MG/DL — HIGH (ref 70–99)
HCG SERPL QL: NEGATIVE — SIGNIFICANT CHANGE UP
TROPONIN T, HIGH SENSITIVITY RESULT: <6 NG/L — SIGNIFICANT CHANGE UP (ref 0–51)

## 2025-07-23 PROCEDURE — 93005 ELECTROCARDIOGRAM TRACING: CPT

## 2025-07-23 PROCEDURE — 82962 GLUCOSE BLOOD TEST: CPT

## 2025-07-23 PROCEDURE — C1769: CPT

## 2025-07-23 PROCEDURE — 85730 THROMBOPLASTIN TIME PARTIAL: CPT

## 2025-07-23 PROCEDURE — 99285 EMERGENCY DEPT VISIT HI MDM: CPT | Mod: 25

## 2025-07-23 PROCEDURE — 71045 X-RAY EXAM CHEST 1 VIEW: CPT

## 2025-07-23 PROCEDURE — 85025 COMPLETE CBC W/AUTO DIFF WBC: CPT

## 2025-07-23 PROCEDURE — C1894: CPT

## 2025-07-23 PROCEDURE — 84484 ASSAY OF TROPONIN QUANT: CPT

## 2025-07-23 PROCEDURE — 99223 1ST HOSP IP/OBS HIGH 75: CPT

## 2025-07-23 PROCEDURE — 93571 IV DOP VEL&/PRESS C FLO 1ST: CPT | Mod: 26,52,LD

## 2025-07-23 PROCEDURE — 93458 L HRT ARTERY/VENTRICLE ANGIO: CPT

## 2025-07-23 PROCEDURE — 85610 PROTHROMBIN TIME: CPT

## 2025-07-23 PROCEDURE — 93799 UNLISTED CV SVC/PROCEDURE: CPT

## 2025-07-23 PROCEDURE — 99234 HOSP IP/OBS SM DT SF/LOW 45: CPT

## 2025-07-23 PROCEDURE — 99152 MOD SED SAME PHYS/QHP 5/>YRS: CPT

## 2025-07-23 PROCEDURE — 80053 COMPREHEN METABOLIC PANEL: CPT

## 2025-07-23 PROCEDURE — 93458 L HRT ARTERY/VENTRICLE ANGIO: CPT | Mod: 26

## 2025-07-23 PROCEDURE — 36415 COLL VENOUS BLD VENIPUNCTURE: CPT

## 2025-07-23 PROCEDURE — 99222 1ST HOSP IP/OBS MODERATE 55: CPT

## 2025-07-23 PROCEDURE — 93010 ELECTROCARDIOGRAM REPORT: CPT

## 2025-07-23 PROCEDURE — C1887: CPT

## 2025-07-23 PROCEDURE — 84703 CHORIONIC GONADOTROPIN ASSAY: CPT

## 2025-07-23 RX ORDER — INSULIN LISPRO 100 U/ML
INJECTION, SOLUTION INTRAVENOUS; SUBCUTANEOUS EVERY 6 HOURS
Refills: 0 | Status: DISCONTINUED | OUTPATIENT
Start: 2025-07-23 | End: 2025-07-23

## 2025-07-23 RX ORDER — GLIMEPIRIDE 4 MG/1
1 TABLET ORAL
Refills: 0 | DISCHARGE

## 2025-07-23 RX ORDER — ASPIRIN 325 MG
324 TABLET ORAL ONCE
Refills: 0 | Status: COMPLETED | OUTPATIENT
Start: 2025-07-23 | End: 2025-07-23

## 2025-07-23 RX ORDER — ONDANSETRON HCL/PF 4 MG/2 ML
4 VIAL (ML) INJECTION EVERY 8 HOURS
Refills: 0 | Status: DISCONTINUED | OUTPATIENT
Start: 2025-07-23 | End: 2025-07-23

## 2025-07-23 RX ORDER — DEXTROSE 50 % IN WATER 50 %
12.5 SYRINGE (ML) INTRAVENOUS ONCE
Refills: 0 | Status: DISCONTINUED | OUTPATIENT
Start: 2025-07-23 | End: 2025-07-23

## 2025-07-23 RX ORDER — LOSARTAN POTASSIUM 100 MG/1
0.5 TABLET, FILM COATED ORAL
Refills: 0 | DISCHARGE

## 2025-07-23 RX ORDER — ASPIRIN 325 MG
81 TABLET ORAL DAILY
Refills: 0 | Status: DISCONTINUED | OUTPATIENT
Start: 2025-07-23 | End: 2025-07-23

## 2025-07-23 RX ORDER — ATORVASTATIN CALCIUM 80 MG/1
1 TABLET, FILM COATED ORAL
Qty: 90 | Refills: 0
Start: 2025-07-23 | End: 2025-10-20

## 2025-07-23 RX ORDER — DEXTROSE 50 % IN WATER 50 %
25 SYRINGE (ML) INTRAVENOUS ONCE
Refills: 0 | Status: DISCONTINUED | OUTPATIENT
Start: 2025-07-23 | End: 2025-07-23

## 2025-07-23 RX ORDER — TIZANIDINE 4 MG/1
1 TABLET ORAL
Refills: 0 | DISCHARGE

## 2025-07-23 RX ORDER — DESVENLAFAXINE 25 MG/1
100 TABLET, EXTENDED RELEASE ORAL DAILY
Refills: 0 | Status: DISCONTINUED | OUTPATIENT
Start: 2025-07-23 | End: 2025-07-23

## 2025-07-23 RX ORDER — DILTIAZEM HYDROCHLORIDE 240 MG/1
180 TABLET, EXTENDED RELEASE ORAL
Refills: 0 | Status: DISCONTINUED | OUTPATIENT
Start: 2025-07-23 | End: 2025-07-23

## 2025-07-23 RX ORDER — MAGNESIUM, ALUMINUM HYDROXIDE 200-200 MG
30 TABLET,CHEWABLE ORAL EVERY 4 HOURS
Refills: 0 | Status: DISCONTINUED | OUTPATIENT
Start: 2025-07-23 | End: 2025-07-23

## 2025-07-23 RX ORDER — GLUCAGON 3 MG/1
1 POWDER NASAL ONCE
Refills: 0 | Status: DISCONTINUED | OUTPATIENT
Start: 2025-07-23 | End: 2025-07-23

## 2025-07-23 RX ORDER — DEXTROSE 50 % IN WATER 50 %
15 SYRINGE (ML) INTRAVENOUS ONCE
Refills: 0 | Status: DISCONTINUED | OUTPATIENT
Start: 2025-07-23 | End: 2025-07-23

## 2025-07-23 RX ORDER — ACETAMINOPHEN 500 MG/5ML
650 LIQUID (ML) ORAL EVERY 6 HOURS
Refills: 0 | Status: DISCONTINUED | OUTPATIENT
Start: 2025-07-23 | End: 2025-07-23

## 2025-07-23 RX ORDER — TRAZODONE HCL 100 MG
100 TABLET ORAL
Refills: 0 | Status: DISCONTINUED | OUTPATIENT
Start: 2025-07-23 | End: 2025-07-23

## 2025-07-23 RX ORDER — ALPRAZOLAM 0.5 MG
0.5 TABLET, EXTENDED RELEASE 24 HR ORAL
Refills: 0 | Status: DISCONTINUED | OUTPATIENT
Start: 2025-07-23 | End: 2025-07-23

## 2025-07-23 RX ORDER — TIZANIDINE 4 MG/1
4 TABLET ORAL AT BEDTIME
Refills: 0 | Status: DISCONTINUED | OUTPATIENT
Start: 2025-07-23 | End: 2025-07-23

## 2025-07-23 RX ORDER — MELATONIN 5 MG
3 TABLET ORAL AT BEDTIME
Refills: 0 | Status: DISCONTINUED | OUTPATIENT
Start: 2025-07-23 | End: 2025-07-23

## 2025-07-23 RX ORDER — NITROGLYCERIN 20 MG/G
0.4 OINTMENT TOPICAL
Refills: 0 | Status: DISCONTINUED | OUTPATIENT
Start: 2025-07-23 | End: 2025-07-23

## 2025-07-23 RX ORDER — LOSARTAN POTASSIUM 100 MG/1
50 TABLET, FILM COATED ORAL DAILY
Refills: 0 | Status: DISCONTINUED | OUTPATIENT
Start: 2025-07-23 | End: 2025-07-23

## 2025-07-23 RX ORDER — ATORVASTATIN CALCIUM 80 MG/1
40 TABLET, FILM COATED ORAL AT BEDTIME
Refills: 0 | Status: DISCONTINUED | OUTPATIENT
Start: 2025-07-23 | End: 2025-07-23

## 2025-07-23 RX ORDER — SODIUM CHLORIDE 9 G/1000ML
1000 INJECTION, SOLUTION INTRAVENOUS
Refills: 0 | Status: DISCONTINUED | OUTPATIENT
Start: 2025-07-23 | End: 2025-07-23

## 2025-07-23 RX ORDER — HEPARIN SODIUM 1000 [USP'U]/ML
5000 INJECTION INTRAVENOUS; SUBCUTANEOUS EVERY 8 HOURS
Refills: 0 | Status: DISCONTINUED | OUTPATIENT
Start: 2025-07-23 | End: 2025-07-23

## 2025-07-23 RX ORDER — VENLAFAXINE HYDROCHLORIDE 37.5 MG/1
100 CAPSULE, EXTENDED RELEASE ORAL DAILY
Refills: 0 | Status: DISCONTINUED | OUTPATIENT
Start: 2025-07-23 | End: 2025-07-23

## 2025-07-23 RX ORDER — OXYBUTYNIN CHLORIDE 5 MG/1
10 TABLET, FILM COATED, EXTENDED RELEASE ORAL DAILY
Refills: 0 | Status: DISCONTINUED | OUTPATIENT
Start: 2025-07-23 | End: 2025-07-23

## 2025-07-23 RX ADMIN — Medication 324 MILLIGRAM(S): at 02:34

## 2025-07-23 RX ADMIN — Medication 250 MILLILITER(S): at 15:24

## 2025-07-23 RX ADMIN — Medication 40 MILLIGRAM(S): at 05:19

## 2025-07-23 RX ADMIN — Medication 500 MILLILITER(S): at 12:10

## 2025-07-23 RX ADMIN — Medication 0.5 MILLIGRAM(S): at 05:29

## 2025-07-23 RX ADMIN — OXYBUTYNIN CHLORIDE 10 MILLIGRAM(S): 5 TABLET, FILM COATED, EXTENDED RELEASE ORAL at 08:46

## 2025-07-23 RX ADMIN — HEPARIN SODIUM 5000 UNIT(S): 1000 INJECTION INTRAVENOUS; SUBCUTANEOUS at 05:19

## 2025-07-23 RX ADMIN — Medication 4 MILLIGRAM(S): at 04:18

## 2025-07-23 RX ADMIN — LOSARTAN POTASSIUM 50 MILLIGRAM(S): 100 TABLET, FILM COATED ORAL at 05:18

## 2025-07-23 RX ADMIN — Medication 81 MILLIGRAM(S): at 08:46

## 2025-07-23 RX ADMIN — DESVENLAFAXINE 100 MILLIGRAM(S): 25 TABLET, EXTENDED RELEASE ORAL at 08:45

## 2025-08-13 ENCOUNTER — APPOINTMENT (OUTPATIENT)
Dept: CARDIOLOGY | Facility: CLINIC | Age: 50
End: 2025-08-13

## 2025-08-13 VITALS
HEART RATE: 85 BPM | OXYGEN SATURATION: 96 % | WEIGHT: 196 LBS | SYSTOLIC BLOOD PRESSURE: 110 MMHG | BODY MASS INDEX: 38.48 KG/M2 | RESPIRATION RATE: 16 BRPM | HEIGHT: 60 IN | DIASTOLIC BLOOD PRESSURE: 70 MMHG

## 2025-08-13 DIAGNOSIS — E66.9 OBESITY, UNSPECIFIED: ICD-10-CM

## 2025-08-13 DIAGNOSIS — I25.10 ATHEROSCLEROTIC HEART DISEASE OF NATIVE CORONARY ARTERY W/OUT ANGINA PECTORIS: ICD-10-CM

## 2025-08-13 DIAGNOSIS — I51.7 CARDIOMEGALY: ICD-10-CM

## 2025-08-13 DIAGNOSIS — I20.1 ANGINA PECTORIS WITH DOCUMENTED SPASM: ICD-10-CM

## 2025-08-13 DIAGNOSIS — E78.00 PURE HYPERCHOLESTEROLEMIA, UNSPECIFIED: ICD-10-CM

## 2025-08-13 DIAGNOSIS — E11.9 TYPE 2 DIABETES MELLITUS W/OUT COMPLICATIONS: ICD-10-CM

## 2025-08-13 PROCEDURE — 99214 OFFICE O/P EST MOD 30 MIN: CPT

## 2025-08-13 PROCEDURE — 93000 ELECTROCARDIOGRAM COMPLETE: CPT

## 2025-08-13 PROCEDURE — G2211 COMPLEX E/M VISIT ADD ON: CPT

## 2025-08-13 RX ORDER — ATORVASTATIN CALCIUM 80 MG/1
80 TABLET, FILM COATED ORAL DAILY
Refills: 0 | Status: ACTIVE | COMMUNITY